# Patient Record
Sex: FEMALE | Race: WHITE | NOT HISPANIC OR LATINO | URBAN - METROPOLITAN AREA
[De-identification: names, ages, dates, MRNs, and addresses within clinical notes are randomized per-mention and may not be internally consistent; named-entity substitution may affect disease eponyms.]

---

## 2020-01-15 ENCOUNTER — OFFICE (OUTPATIENT)
Dept: URBAN - METROPOLITAN AREA CLINIC 64 | Facility: CLINIC | Age: 65
End: 2020-01-15

## 2020-01-15 VITALS
WEIGHT: 160 LBS | SYSTOLIC BLOOD PRESSURE: 148 MMHG | DIASTOLIC BLOOD PRESSURE: 89 MMHG | HEIGHT: 64 IN | HEART RATE: 88 BPM

## 2020-01-15 DIAGNOSIS — K64.8 OTHER HEMORRHOIDS: ICD-10-CM

## 2020-01-15 DIAGNOSIS — R14.0 ABDOMINAL DISTENSION (GASEOUS): ICD-10-CM

## 2020-01-15 DIAGNOSIS — K59.00 CONSTIPATION, UNSPECIFIED: ICD-10-CM

## 2020-01-15 DIAGNOSIS — Z86.010 PERSONAL HISTORY OF COLONIC POLYPS: ICD-10-CM

## 2020-01-15 DIAGNOSIS — K64.4 RESIDUAL HEMORRHOIDAL SKIN TAGS: ICD-10-CM

## 2020-01-15 PROCEDURE — 46600 DIAGNOSTIC ANOSCOPY SPX: CPT | Performed by: NURSE PRACTITIONER

## 2020-01-15 PROCEDURE — 99203 OFFICE O/P NEW LOW 30 MIN: CPT | Performed by: NURSE PRACTITIONER

## 2020-01-22 ENCOUNTER — PREP FOR SURGERY (OUTPATIENT)
Dept: OTHER | Facility: HOSPITAL | Age: 65
End: 2020-01-22

## 2020-01-22 ENCOUNTER — HOSPITAL ENCOUNTER (OUTPATIENT)
Facility: HOSPITAL | Age: 65
Setting detail: HOSPITAL OUTPATIENT SURGERY
End: 2020-01-22
Attending: SURGERY | Admitting: SURGERY

## 2020-01-22 ENCOUNTER — OFFICE VISIT (OUTPATIENT)
Dept: SURGERY | Facility: CLINIC | Age: 65
End: 2020-01-22

## 2020-01-22 VITALS
HEIGHT: 64 IN | WEIGHT: 157 LBS | SYSTOLIC BLOOD PRESSURE: 148 MMHG | DIASTOLIC BLOOD PRESSURE: 90 MMHG | HEART RATE: 78 BPM | BODY MASS INDEX: 26.8 KG/M2 | OXYGEN SATURATION: 98 %

## 2020-01-22 DIAGNOSIS — K64.9 HEMORRHOIDS: Primary | ICD-10-CM

## 2020-01-22 DIAGNOSIS — K64.9 HEMORRHOIDS, UNSPECIFIED HEMORRHOID TYPE: Primary | ICD-10-CM

## 2020-01-22 PROBLEM — Z17.0: Status: ACTIVE | Noted: 2017-10-16

## 2020-01-22 PROBLEM — E78.5 HYPERLIPIDEMIA: Status: ACTIVE | Noted: 2020-01-22

## 2020-01-22 PROBLEM — I10 HYPERTENSION: Status: ACTIVE | Noted: 2020-01-22

## 2020-01-22 PROBLEM — C50.911: Status: ACTIVE | Noted: 2017-10-16

## 2020-01-22 PROBLEM — E11.9 DM TYPE 2 (DIABETES MELLITUS, TYPE 2): Status: ACTIVE | Noted: 2020-01-22

## 2020-01-22 PROCEDURE — 99203 OFFICE O/P NEW LOW 30 MIN: CPT | Performed by: SURGERY

## 2020-01-22 RX ORDER — CETIRIZINE HYDROCHLORIDE 10 MG/1
10 TABLET ORAL DAILY
COMMUNITY

## 2020-01-22 RX ORDER — CLONIDINE HYDROCHLORIDE 0.1 MG/1
TABLET ORAL
COMMUNITY
Start: 2020-01-18 | End: 2022-04-04

## 2020-01-22 RX ORDER — LEVOTHYROXINE SODIUM 0.12 MG/1
125 TABLET ORAL DAILY
COMMUNITY
Start: 2014-05-09 | End: 2022-04-04 | Stop reason: SDUPTHER

## 2020-01-22 RX ORDER — LOSARTAN POTASSIUM AND HYDROCHLOROTHIAZIDE 25; 100 MG/1; MG/1
TABLET ORAL
COMMUNITY
Start: 2020-01-18 | End: 2022-06-09 | Stop reason: SDUPTHER

## 2020-01-22 RX ORDER — CHLORAL HYDRATE 500 MG
1 CAPSULE ORAL
COMMUNITY

## 2020-01-22 RX ORDER — ATORVASTATIN CALCIUM 10 MG/1
TABLET, FILM COATED ORAL
COMMUNITY
Start: 2020-01-18 | End: 2022-04-04

## 2020-01-22 RX ORDER — GUAIFENESIN 600 MG/1
1200 TABLET, EXTENDED RELEASE ORAL
COMMUNITY

## 2020-01-22 RX ORDER — VENLAFAXINE HYDROCHLORIDE 75 MG/1
CAPSULE, EXTENDED RELEASE ORAL
COMMUNITY
Start: 2020-01-18 | End: 2022-04-04 | Stop reason: SDUPTHER

## 2020-01-22 RX ORDER — LORATADINE 10 MG/1
10 TABLET ORAL DAILY
COMMUNITY

## 2020-01-22 RX ORDER — DOCUSATE CALCIUM 240 MG
240 CAPSULE ORAL
COMMUNITY

## 2020-01-22 NOTE — H&P
Subjective   Day Goldstein is a 64 y.o. female.   No chief complaint on file.    There were no vitals taken for this visit.    HISTORY OF PRESENT ILLNESS:  Patient is a pleasant 64-year-old female presenting for evaluation of hemorrhoids.  Patient complains of associated pain and intermittent bleeding.  She has had these symptoms for at least several months.  She also complains of associated hygiene trouble.  Her symptoms have been present for at least several months.  She has been evaluated previously at her gastroenterologist office.  They recommended banding the internal hemorrhoids, but the patient would like external hemorrhoids addressed as well.  Patient reports previous history of chronic constipation which is now controlled using MiraLAX and Metamucil.  She states her hemorrhoids intermittently enlarge and shrink.  She would like to have procedure performed after April due to travel plans.    The following portions of the patient's history were reviewed and updated as appropriate: allergies, current medications, past family history, past medical history, past social history, past surgical history and problem list.    Review of Systems   Constitutional: Negative for fever.   HENT: Positive for sinus pressure.         Dry mouth   Eyes: Negative for blurred vision.   Respiratory: Negative for shortness of breath.    Cardiovascular: Negative for chest pain.   Gastrointestinal: Positive for anal bleeding and rectal pain.   Endocrine: Positive for heat intolerance.   Musculoskeletal: Positive for back pain.   Neurological: Negative for dizziness.   Psychiatric/Behavioral: Negative for depressed mood.     I have reviewed the above ROS and updated as appropriate.    Objective   Physical Exam   Constitutional: She appears well-developed and well-nourished. No distress.   HENT:   Head: Normocephalic and atraumatic.   Within normal limits   Eyes: EOM are normal.   Neck: Normal range of motion.   Cardiovascular:  Normal rate.   Good color; no pallor or cyanosis   Pulmonary/Chest: Effort normal. No respiratory distress.   Genitourinary:   Genitourinary Comments: At least one external hemorrhoid visualized with leafy tissue surrounding anal opening.   Musculoskeletal: Normal range of motion.   Neurological: She is alert. No cranial nerve deficit.   Skin: Skin is dry.   Psychiatric: She has a normal mood and affect. Her behavior is normal.   Vitals reviewed.    Assessment/Plan   Diagnoses and all orders for this visit:    Hemorrhoids  -     Case Request; Standing  -     Case Request    Other orders  -     Follow Anesthesia Guidelines / Standing Orders; Future  -     Provide NPO Instructions to Patient; Future    Discussed with patient that hemorrhoidectomy is extremely painful, explained painful recovery.  Explained need for sitz bath twice daily for 4 to 6 weeks postop.  Patient understands and agrees to plan and would like to proceed with surgery.  We will schedule hemorrhoidectomy after April.    Sheila Tabor MD, 1/22/2020 2:26 PM, acting as scribe for Dr. Tabor.    I, Sheila Tabor MD, personally performed the services described in this documentation as scribed by the above named individual in my presence, and it is both accurate and complete.  1/22/2020  2:43 PM

## 2020-01-22 NOTE — PROGRESS NOTES
"Subjective   Day Goldstein is a 64 y.o. female.   Chief Complaint   Patient presents with   • Hemorrhoids     /90 (BP Location: Left arm, Patient Position: Sitting, Cuff Size: Adult)   Pulse 78   Ht 162.6 cm (64\")   Wt 71.2 kg (157 lb)   SpO2 98%   BMI 26.95 kg/m²     HISTORY OF PRESENT ILLNESS:  Patient is a pleasant 64-year-old female presenting for evaluation of hemorrhoids.  Patient complains of associated pain and intermittent bleeding.  She has had these symptoms for at least several months.  She also complains of associated hygiene trouble.  Her symptoms have been present for at least several months.  She has been evaluated previously at her gastroenterologist office.  They recommended banding the internal hemorrhoids, but the patient would like external hemorrhoids addressed as well.  Patient reports previous history of chronic constipation which is now controlled using MiraLAX and Metamucil.  She states her hemorrhoids intermittently enlarge and shrink.  She would like to have procedure performed after April due to travel plans.    The following portions of the patient's history were reviewed and updated as appropriate: allergies, current medications, past family history, past medical history, past social history, past surgical history and problem list.    Review of Systems   Constitutional: Negative for fever.   HENT: Positive for sinus pressure.         Dry mouth   Eyes: Negative for blurred vision.   Respiratory: Negative for shortness of breath.    Cardiovascular: Negative for chest pain.   Gastrointestinal: Positive for anal bleeding and rectal pain.   Endocrine: Positive for heat intolerance.   Musculoskeletal: Positive for back pain.   Neurological: Negative for dizziness.   Psychiatric/Behavioral: Negative for depressed mood.     I have reviewed the above ROS and updated as appropriate.    Objective   Physical Exam   Constitutional: She appears well-developed and well-nourished. No " distress.   HENT:   Head: Normocephalic and atraumatic.   Within normal limits   Eyes: EOM are normal.   Neck: Normal range of motion.   Cardiovascular: Normal rate.   Good color; no pallor or cyanosis   Pulmonary/Chest: Effort normal. No respiratory distress.   Genitourinary:   Genitourinary Comments: At least one external hemorrhoid visualized with leafy tissue surrounding anal opening.   Musculoskeletal: Normal range of motion.   Neurological: She is alert. No cranial nerve deficit.   Skin: Skin is dry.   Psychiatric: She has a normal mood and affect. Her behavior is normal.   Vitals reviewed.    Assessment/Plan   Day was seen today for hemorrhoids.    Diagnoses and all orders for this visit:    Hemorrhoids, unspecified hemorrhoid type    Discussed with patient that hemorrhoidectomy is extremely painful, explained painful recovery.  Explained need for sitz bath twice daily for 4 to 6 weeks postop.  Patient understands and agrees to plan and would like to proceed with surgery.  We will schedule hemorrhoidectomy after April.    Sandrine Watt PA-C, 1/22/2020 2:26 PM, acting as scribe for Dr. Tabor.    I, Sheila Tabor MD, personally performed the services described in this documentation as scribed by the above named individual in my presence, and it is both accurate and complete.  1/22/2020  2:42 PM

## 2020-05-22 ENCOUNTER — PREP FOR SURGERY (OUTPATIENT)
Dept: OTHER | Facility: HOSPITAL | Age: 65
End: 2020-05-22

## 2020-05-22 DIAGNOSIS — K64.9 HEMORRHOIDS, UNSPECIFIED HEMORRHOID TYPE: Primary | ICD-10-CM

## 2020-05-22 RX ORDER — CLINDAMYCIN PHOSPHATE 900 MG/50ML
900 INJECTION, SOLUTION INTRAVENOUS ONCE
Status: CANCELLED | OUTPATIENT
Start: 2020-05-22 | End: 2020-05-22

## 2020-08-26 ENCOUNTER — TELEPHONE (OUTPATIENT)
Dept: SURGERY | Facility: CLINIC | Age: 65
End: 2020-08-26

## 2020-09-18 ENCOUNTER — PREP FOR SURGERY (OUTPATIENT)
Dept: OTHER | Facility: HOSPITAL | Age: 65
End: 2020-09-18

## 2020-09-18 ENCOUNTER — OFFICE VISIT (OUTPATIENT)
Dept: SURGERY | Facility: CLINIC | Age: 65
End: 2020-09-18

## 2020-09-18 VITALS — TEMPERATURE: 96.8 F

## 2020-09-18 DIAGNOSIS — K64.9 HEMORRHOIDS, UNSPECIFIED HEMORRHOID TYPE: Primary | ICD-10-CM

## 2020-09-18 PROCEDURE — 99213 OFFICE O/P EST LOW 20 MIN: CPT | Performed by: SURGERY

## 2020-09-18 RX ORDER — CLINDAMYCIN PHOSPHATE 900 MG/50ML
900 INJECTION, SOLUTION INTRAVENOUS ONCE
Status: CANCELLED | OUTPATIENT
Start: 2020-09-18 | End: 2020-09-18

## 2020-09-18 NOTE — H&P
Subjective   Day Goldstein is a 65 y.o. female.   No chief complaint on file.    There were no vitals taken for this visit.    HISTORY OF PRESENT ILLNESS:  She was last seen by me in January with a symptomatic internal and external hemorrhoid.  She was scheduled for surgery which had to be canceled due to the coronavirus pandemic.  She is remained persistently symptomatic and now desires hemorrhoidectomy.      The following portions of the patient's history were reviewed and updated as appropriate: allergies, current medications, past family history, past medical history, past social history, past surgical history and problem list.    Review of Systems   Constitutional: Negative for activity change and diaphoresis.   HENT: Negative.  Negative for sore throat and voice change.    Eyes: Negative for blurred vision.   Respiratory: Negative for wheezing.    Cardiovascular: Negative for chest pain.   Gastrointestinal: Positive for anal bleeding and rectal pain.   Endocrine: Negative.  Negative for polyuria.   Genitourinary: Negative for urinary incontinence.   Musculoskeletal: Negative for arthralgias.   Skin: Negative for color change.   Neurological: Negative for dizziness, speech difficulty and confusion.   Hematological: Does not bruise/bleed easily.   Psychiatric/Behavioral: Negative for agitation.       Objective   Physical Exam  Constitutional:       Appearance: She is well-developed.   HENT:      Head: Normocephalic and atraumatic.   Neck:      Musculoskeletal: Normal range of motion.   Cardiovascular:      Rate and Rhythm: Normal rate.   Pulmonary:      Effort: Pulmonary effort is normal.   Abdominal:      Palpations: Abdomen is soft.   Genitourinary:     Comments: Single internal/external hemorrhoid  Musculoskeletal: Normal range of motion.   Skin:     General: Skin is warm and dry.   Neurological:      Mental Status: She is alert and oriented to person, place, and time.           Assessment/Plan   There  are no diagnoses linked to this encounter.  Patient would benefit from hemorrhoidectomy.  She is failed medical management.  I have discussed the risk of general anesthesia bleeding infection as well as postop pain.  She does understand and agree with plan as outlined.  Sheila Tabor MD  9/18/2020  12:06 PM EDT

## 2020-09-18 NOTE — PROGRESS NOTES
Subjective   Day Goldstein is a 65 y.o. female.   Chief Complaint   Patient presents with   • Hemorrhoids     f/u     Temp 96.8 °F (36 °C) (Temporal)     HISTORY OF PRESENT ILLNESS:  She was last seen by me in January with a symptomatic internal and external hemorrhoid.  She was scheduled for surgery which had to be canceled due to the coronavirus pandemic.  She is remained persistently symptomatic and now desires hemorrhoidectomy.      The following portions of the patient's history were reviewed and updated as appropriate: allergies, current medications, past family history, past medical history, past social history, past surgical history and problem list.    Review of Systems   Constitutional: Negative for activity change and diaphoresis.   HENT: Negative.  Negative for sore throat and voice change.    Eyes: Negative for blurred vision.   Respiratory: Negative for wheezing.    Cardiovascular: Negative for chest pain.   Gastrointestinal: Positive for anal bleeding and rectal pain.   Endocrine: Negative.  Negative for polyuria.   Genitourinary: Negative for urinary incontinence.   Musculoskeletal: Negative for arthralgias.   Skin: Negative for color change.   Neurological: Negative for dizziness, speech difficulty and confusion.   Hematological: Does not bruise/bleed easily.   Psychiatric/Behavioral: Negative for agitation.       Objective   Physical Exam  Constitutional:       Appearance: She is well-developed.   HENT:      Head: Normocephalic and atraumatic.   Neck:      Musculoskeletal: Normal range of motion.   Cardiovascular:      Rate and Rhythm: Normal rate.   Pulmonary:      Effort: Pulmonary effort is normal.   Abdominal:      Palpations: Abdomen is soft.   Genitourinary:     Comments: Single internal/external hemorrhoid  Musculoskeletal: Normal range of motion.   Skin:     General: Skin is warm and dry.   Neurological:      Mental Status: She is alert and oriented to person, place, and time.            Assessment/Plan   Day was seen today for hemorrhoids.    Diagnoses and all orders for this visit:    Hemorrhoids, unspecified hemorrhoid type      Patient would benefit from hemorrhoidectomy.  She is failed medical management.  I have discussed the risk of general anesthesia bleeding infection as well as postop pain.  She does understand and agree with plan as outlined.  Sheila Tabor MD  9/18/2020  12:06 PM EDT

## 2022-04-01 PROBLEM — E55.9 VITAMIN D DEFICIENCY: Status: ACTIVE | Noted: 2022-04-01

## 2022-04-01 PROBLEM — Z78.0 POSTMENOPAUSAL STATE: Status: ACTIVE | Noted: 2022-04-01

## 2022-04-01 PROBLEM — E11.9 TYPE 2 DIABETES MELLITUS WITHOUT COMPLICATION, WITHOUT LONG-TERM CURRENT USE OF INSULIN (HCC): Chronic | Status: ACTIVE | Noted: 2020-01-22

## 2022-04-01 PROBLEM — E55.9 VITAMIN D DEFICIENCY: Chronic | Status: ACTIVE | Noted: 2022-04-01

## 2022-04-01 PROBLEM — Z51.81 THERAPEUTIC DRUG MONITORING: Status: ACTIVE | Noted: 2022-04-01

## 2022-04-01 PROBLEM — Z85.3 HISTORY OF RIGHT BREAST CANCER: Status: ACTIVE | Noted: 2017-10-16

## 2022-04-01 PROBLEM — E78.2 MIXED HYPERLIPIDEMIA: Status: ACTIVE | Noted: 2020-01-22

## 2022-04-01 PROBLEM — E78.2 MIXED HYPERLIPIDEMIA: Chronic | Status: ACTIVE | Noted: 2020-01-22

## 2022-04-01 PROBLEM — Z98.890 HISTORY OF HEMORRHOIDECTOMY: Status: ACTIVE | Noted: 2020-01-22

## 2022-04-01 PROBLEM — Z85.3 HISTORY OF RIGHT BREAST CANCER: Chronic | Status: ACTIVE | Noted: 2017-10-16

## 2022-04-01 PROBLEM — Z90.11 S/P RIGHT MASTECTOMY: Status: ACTIVE | Noted: 2020-08-17

## 2022-04-01 PROBLEM — Z78.0 POSTMENOPAUSAL STATE: Chronic | Status: ACTIVE | Noted: 2022-04-01

## 2022-04-01 PROBLEM — F41.8 DEPRESSION WITH ANXIETY: Status: ACTIVE | Noted: 2022-04-01

## 2022-04-01 PROBLEM — I10 BENIGN ESSENTIAL HYPERTENSION: Chronic | Status: ACTIVE | Noted: 2020-01-22

## 2022-04-04 ENCOUNTER — OFFICE VISIT (OUTPATIENT)
Dept: INTERNAL MEDICINE | Facility: CLINIC | Age: 67
End: 2022-04-04

## 2022-04-04 VITALS
HEIGHT: 64 IN | RESPIRATION RATE: 16 BRPM | DIASTOLIC BLOOD PRESSURE: 71 MMHG | SYSTOLIC BLOOD PRESSURE: 150 MMHG | BODY MASS INDEX: 28 KG/M2 | WEIGHT: 164 LBS | HEART RATE: 84 BPM | OXYGEN SATURATION: 99 %

## 2022-04-04 DIAGNOSIS — I10 BENIGN ESSENTIAL HYPERTENSION: Chronic | ICD-10-CM

## 2022-04-04 DIAGNOSIS — M54.40 CHRONIC MIDLINE LOW BACK PAIN WITH SCIATICA, SCIATICA LATERALITY UNSPECIFIED: Primary | Chronic | ICD-10-CM

## 2022-04-04 DIAGNOSIS — G89.29 CHRONIC MIDLINE LOW BACK PAIN WITH SCIATICA, SCIATICA LATERALITY UNSPECIFIED: Primary | Chronic | ICD-10-CM

## 2022-04-04 DIAGNOSIS — Z90.11 S/P RIGHT MASTECTOMY: Chronic | ICD-10-CM

## 2022-04-04 DIAGNOSIS — E66.3 OVERWEIGHT: ICD-10-CM

## 2022-04-04 DIAGNOSIS — Z78.9 STATIN INTOLERANCE: Chronic | ICD-10-CM

## 2022-04-04 DIAGNOSIS — E55.9 VITAMIN D DEFICIENCY: Chronic | ICD-10-CM

## 2022-04-04 DIAGNOSIS — G89.29 CHRONIC LEFT-SIDED LOW BACK PAIN WITH LEFT-SIDED SCIATICA: ICD-10-CM

## 2022-04-04 DIAGNOSIS — Z91.09 MULTIPLE ENVIRONMENTAL ALLERGIES: ICD-10-CM

## 2022-04-04 DIAGNOSIS — Z78.0 POSTMENOPAUSAL STATE: Chronic | ICD-10-CM

## 2022-04-04 DIAGNOSIS — Z11.59 NEED FOR HEPATITIS C SCREENING TEST: ICD-10-CM

## 2022-04-04 DIAGNOSIS — K59.09 CHRONIC CONSTIPATION: ICD-10-CM

## 2022-04-04 DIAGNOSIS — M54.42 CHRONIC LEFT-SIDED LOW BACK PAIN WITH LEFT-SIDED SCIATICA: ICD-10-CM

## 2022-04-04 DIAGNOSIS — E11.9 TYPE 2 DIABETES MELLITUS WITHOUT COMPLICATION, WITHOUT LONG-TERM CURRENT USE OF INSULIN: Chronic | ICD-10-CM

## 2022-04-04 DIAGNOSIS — M48.061 SPINAL STENOSIS OF LUMBAR REGION WITHOUT NEUROGENIC CLAUDICATION: Chronic | ICD-10-CM

## 2022-04-04 DIAGNOSIS — Z51.81 THERAPEUTIC DRUG MONITORING: ICD-10-CM

## 2022-04-04 DIAGNOSIS — Z85.3 HISTORY OF RIGHT BREAST CANCER: Chronic | ICD-10-CM

## 2022-04-04 DIAGNOSIS — G47.33 OBSTRUCTIVE SLEEP APNEA: Chronic | ICD-10-CM

## 2022-04-04 DIAGNOSIS — E78.2 MIXED HYPERLIPIDEMIA: Chronic | ICD-10-CM

## 2022-04-04 DIAGNOSIS — F41.8 DEPRESSION WITH ANXIETY: Chronic | ICD-10-CM

## 2022-04-04 PROBLEM — L71.9 ROSACEA: Status: ACTIVE | Noted: 2022-04-04

## 2022-04-04 PROBLEM — Z86.010 HISTORY OF COLON POLYPS: Chronic | Status: ACTIVE | Noted: 2022-04-04

## 2022-04-04 PROBLEM — Z86.0100 HISTORY OF COLON POLYPS: Chronic | Status: ACTIVE | Noted: 2022-04-04

## 2022-04-04 PROBLEM — M54.50 CHRONIC LOWER BACK PAIN: Chronic | Status: ACTIVE | Noted: 2022-04-04

## 2022-04-04 PROBLEM — Z86.0100 HISTORY OF COLON POLYPS: Status: ACTIVE | Noted: 2022-04-04

## 2022-04-04 PROBLEM — M54.50 CHRONIC LOWER BACK PAIN: Status: ACTIVE | Noted: 2022-04-04

## 2022-04-04 PROBLEM — L71.9 ROSACEA: Chronic | Status: ACTIVE | Noted: 2022-04-04

## 2022-04-04 PROBLEM — Z86.010 HISTORY OF COLON POLYPS: Status: ACTIVE | Noted: 2022-04-04

## 2022-04-04 PROBLEM — Z98.890 HISTORY OF HEMORRHOIDECTOMY: Status: RESOLVED | Noted: 2020-01-22 | Resolved: 2022-04-04

## 2022-04-04 PROCEDURE — 99204 OFFICE O/P NEW MOD 45 MIN: CPT | Performed by: INTERNAL MEDICINE

## 2022-04-04 RX ORDER — GLIPIZIDE 5 MG/1
5 TABLET, FILM COATED, EXTENDED RELEASE ORAL DAILY
COMMUNITY
End: 2022-04-04

## 2022-04-04 RX ORDER — VENLAFAXINE HYDROCHLORIDE 75 MG/1
CAPSULE, EXTENDED RELEASE ORAL
Qty: 90 CAPSULE | Refills: 3
Start: 2022-04-04 | End: 2022-06-09 | Stop reason: SDUPTHER

## 2022-04-04 RX ORDER — ATORVASTATIN CALCIUM 10 MG/1
TABLET, FILM COATED ORAL
Qty: 90 TABLET | Refills: 3
Start: 2022-04-04 | End: 2022-06-09 | Stop reason: SDUPTHER

## 2022-04-04 RX ORDER — LEVOTHYROXINE SODIUM 0.12 MG/1
TABLET ORAL
Qty: 90 TABLET | Refills: 3
Start: 2022-04-04 | End: 2022-06-09 | Stop reason: SDUPTHER

## 2022-04-04 RX ORDER — ATORVASTATIN CALCIUM 10 MG/1
TABLET, FILM COATED ORAL EVERY 24 HOURS
COMMUNITY
End: 2022-04-04 | Stop reason: SDUPTHER

## 2022-04-04 NOTE — PROGRESS NOTES
04/04/2022    Patient Information  Day Goldstein                                                                                          [address] [place] [address]      1955  [unfilled]  There is no work phone number on file.    Chief Complaint:     New patient to get established.  Complaining of chronic lower back pain.    History of Present Illness:    Patient with multiple medical problems including type 2 diabetes, hyperlipidemia, statin intolerance except for Lipitor, lumbar spinal stenosis and chronic lower back pain, hypertension, remote history of right breast cancer status post mastectomy, sleep apnea, overweight, depression with anxiety in remission with Effexor, chronic constipation, vitamin D deficiency, postmenopausal state, environmental allergies.  She presents today to get established and also wants to discuss her chronic lower back pain.  Her past medical history reviewed and updated were necessary including health maintenance parameters.  This reveals she needs Medicare wellness visit.  Patient also had a colonoscopy but I do not have that documentation.  She also needs a DEXA scan.    Review of Systems   Constitutional: Negative.   HENT: Negative.    Eyes: Negative.    Cardiovascular: Negative.    Respiratory: Negative.    Endocrine: Negative.    Hematologic/Lymphatic: Negative.    Skin: Negative.    Musculoskeletal: Positive for arthritis and back pain.   Gastrointestinal: Negative.    Genitourinary: Negative.    Neurological: Negative.    Psychiatric/Behavioral: Negative.    Allergic/Immunologic: Negative.        Active Problems:    Patient Active Problem List   Diagnosis   • Type 2 diabetes mellitus without complication, without long-term current use of insulin (HCC)   • Hyperlipidemia   • Benign essential hypertension   • History of right breast cancer, 2004--status post mastectomy, chemotherapy, Adriamycin and Cytoxan.  Arimidex.   • S/P right mastectomy   •  Therapeutic drug monitoring   • Vitamin D deficiency   • Postmenopausal state   • Depression with anxiety   • Chronic left-sided low back pain with left-sided sciatica   • Obstructive sleep apnea   • Spinal stenosis of lumbar region without neurogenic claudication   • Chronic constipation   • Overweight   • Rosacea   • Statin intolerance, except for Lipitor.  Myalgias   • Multiple environmental allergies   • History of colon polyps         Past Medical History:   Diagnosis Date   • Benign essential hypertension 1/22/2020   • Chronic constipation 4/4/2022   • Chronic left-sided low back pain with left-sided sciatica 4/4/2022 October 30, 2020--MRI of the lumbar spine reveals no evidence of osseous metastatic disease.  Facet osteophytes and disc bulges resulting in moderate to severe neural foraminal stenosis at L3-L4, L4-L5, and L5-S1.  There is moderate to severe spinal stenosis at L3-L4 and L4-L5.   • Chronic lower back pain 4/4/2022 October 30, 2020--MRI of the lumbar spine reveals no evidence of osseous metastatic disease.  Facet osteophytes and disc bulges resulting in moderate to severe neural foraminal stenosis at L3-L4, L4-L5, and L5-S1.  There is moderate to severe spinal stenosis at L3-L4 and L4-L5.   • Depression with anxiety 4/1/2022   • History of colon polyps 4/4/2022 August 22, 2018--colonoscopy was normal except for redundant colon.  This was followed up with an air-contrast barium enema which revealed a very long and tortuous colon.  No mass or polyp was noted.   • History of right breast cancer 10/16/2017    3/23/2004: Right mastectomy/axillary node dissection.   • Hyperlipidemia 1/22/2020   • Multiple environmental allergies 4/4/2022   • Obstructive sleep apnea 4/4/2022   • Overweight 4/4/2022   • Postmenopausal state 4/1/2022   • S/P right mastectomy 8/17/2020    3/23/2004: Right mastectomy/axillary node dissection.   • Spinal stenosis of lumbar region without neurogenic claudication  4/4/2022   • Statin intolerance, except for Lipitor.  Myalgias 4/4/2022   • Type 2 diabetes mellitus without complication, without long-term current use of insulin (HCC) 1/22/2020   • Vitamin D deficiency 4/1/2022         Past Surgical History:   Procedure Laterality Date   • COLONOSCOPY  08/22/2018 August 22, 2018--colonoscopy was normal except for redundant colon.  This was followed up with an air-contrast barium enema which revealed a very long and tortuous colon.  No mass or polyp was noted.   • HEMORRHOIDECTOMY  01/26/2022 January 26, 2022--excision of internal and external grade 3 hemorrhoids.   • MASTECTOMY MODIFIED RADICAL / SIMPLE / COMPLETE Right 03/23/2004    3/23/2004: Right mastectomy/axillary node dissection.   • TONSILLECTOMY           Allergies   Allergen Reactions   • Hydromorphone Hcl Itching   • Statins Myalgia   • Thimerosal Unknown - High Severity   • Penicillins Itching and Rash           Current Outpatient Medications:   •  atorvastatin (LIPITOR) 10 MG tablet, Take 1 p.o. daily for high cholesterol, Disp: 90 tablet, Rfl: 3  •  CALCIUM & MAGNESIUM CARBONATES PO, Take 1 tablet by mouth Daily., Disp: , Rfl:   •  cetirizine (zyrTEC) 10 MG tablet, Take 10 mg by mouth Daily., Disp: , Rfl:   •  Cholecalciferol (vitamin D3) 125 MCG (5000 UT) capsule capsule, Take 5,000 Units by mouth Daily., Disp: , Rfl:   •  docusate calcium (SURFAK) 240 MG capsule, Take 240 mg by mouth., Disp: , Rfl:   •  guaiFENesin (MUCINEX) 600 MG 12 hr tablet, Take 1,200 mg by mouth., Disp: , Rfl:   •  levothyroxine (SYNTHROID, LEVOTHROID) 125 MCG tablet, Take 1 p.o. daily for low thyroid, Disp: 90 tablet, Rfl: 3  •  loratadine (CLARITIN) 10 MG tablet, Take 10 mg by mouth Daily., Disp: , Rfl:   •  losartan-hydrochlorothiazide (HYZAAR) 100-25 MG per tablet, , Disp: , Rfl:   •  metFORMIN (GLUCOPHAGE) 1000 MG tablet, Take 1 p.o. twice daily for diabetes, Disp: 180 tablet, Rfl: 3  •  Omega-3 Fatty Acids (FISH OIL) 1000 MG  "capsule capsule, Take 1 capsule by mouth., Disp: , Rfl:   •  Polyethylene Glycol 3350 (MIRALAX PO), Take  by mouth., Disp: , Rfl:   •  venlafaxine XR (EFFEXOR-XR) 75 MG 24 hr capsule, Take 1 p.o. daily for depression, Disp: 90 capsule, Rfl: 3  •  Coenzyme Q10 (CoQ10 Maximum Strength) 400 MG capsule, Take 1 p.o. daily with food as directed, Disp: , Rfl: 0      Family History   Problem Relation Age of Onset   • Emphysema Mother    • Alcohol abuse Father    • Hyperlipidemia Sister    • Breast cancer Sister    • Prostate cancer Brother    • Hypothyroidism Brother          Social History     Socioeconomic History   • Marital status:    Tobacco Use   • Smoking status: Never Smoker   • Smokeless tobacco: Never Used   Vaping Use   • Vaping Use: Never used   Substance and Sexual Activity   • Alcohol use: Not Currently     Comment: Social alcohol consumption,, maybe 2 drinks per month.   • Drug use: Never   • Sexual activity: Yes     Partners: Male         Vitals:    04/04/22 1406   BP: 150/71   BP Location: Right arm   Patient Position: Sitting   Cuff Size: Adult   Pulse: 84   Resp: 16   SpO2: 99%   Weight: 74.4 kg (164 lb)   Height: 162.6 cm (64\")        Body mass index is 28.15 kg/m².      Physical Exam:    General: Alert and oriented x 3.  No acute distress.  Overweight.  Normal affect.  HEENT: Pupils equal, round, reactive to light; extraocular movements intact; sclerae nonicteric; pharynx, ear canals and TMs normal.  Neck: Without JVD, thyromegaly, bruit, or adenopathy.  Lungs: Clear to auscultation in all fields.  Heart: Regular rate and rhythm without murmur, rub, gallop, or click.  Abdomen: Soft, nontender, without hepatosplenomegaly or hernia.  Bowel sounds normal.  : Deferred.  Rectal: Deferred.  Extremities: Without clubbing, cyanosis, edema, or pulse deficit.  Neurologic: Intact without focal deficit.  Normal station and gait observed during ingress and egress from the examination room.  Skin: Without " significant lesion.  Musculoskeletal: Unremarkable.    Lab/other results:    I reviewed the results of the MRI of the lumbar spine which was obtained October 30, 2020.    Assessment/Plan:     Diagnosis Plan   1. Chronic midline low back pain with sciatica, sciatica laterality unspecified  Ambulatory Referral to Orthopedic Surgery   2. Spinal stenosis of lumbar region without neurogenic claudication  Ambulatory Referral to Orthopedic Surgery   3. Hyperlipidemia  CK    Comprehensive Metabolic Panel    NMR LipoProfile    TSH    T4, Free    T3, Free    atorvastatin (LIPITOR) 10 MG tablet    levothyroxine (SYNTHROID, LEVOTHROID) 125 MCG tablet   4. Statin intolerance, except for Lipitor.  Myalgias  Coenzyme Q10 (CoQ10 Maximum Strength) 400 MG capsule   5. Type 2 diabetes mellitus without complication, without long-term current use of insulin (HCC)  Hemoglobin A1c    Microalbumin / Creatinine Urine Ratio - Urine, Clean Catch    Urinalysis With Microscopic If Indicated (No Culture) - Urine, Clean Catch    metFORMIN (GLUCOPHAGE) 1000 MG tablet   6. Benign essential hypertension     7. History of right breast cancer     8. S/P right mastectomy     9. Obstructive sleep apnea     10. Overweight     11. Depression with anxiety  venlafaxine XR (EFFEXOR-XR) 75 MG 24 hr capsule   12. Chronic constipation     13. Vitamin D deficiency  Vitamin D 25 Hydroxy   14. Postmenopausal state  DEXA Bone Density Axial   15. Multiple environmental allergies     16. Need for hepatitis C screening test  Hepatitis C Antibody   17. Therapeutic drug monitoring  CBC (No Diff)   18. Chronic left-sided low back pain with left-sided sciatica       Patient with chronic lower back pain and lumbar spinal stenosis and I think she would benefit from orthopedic spine specialist referral.  Patient has hyperlipidemia but is not tolerating Lipitor on a daily basis.  She is taking coenzyme Q 10 but only taking 100 mg/day.  See below.  She will be possibly  entering a study at [facility] in the near future.  She has type 2 diabetes and reports her blood sugars have been running in the 200s at times.  See below for medication adjustment.  Her blood pressure little high today but I hate to make a change based on one reading other than I think she can do without the Catapres which was prescribed primarily for menopausal symptoms such as hot flashes.  She has a history of right breast cancer treated with chemo and mastectomy followed up with hormonal therapy without any evidence of recurrence.  She has sleep apnea and tolerates CPAP well.  She needs vitamin D assessed because of her postmenopausal status and I think it is due for a DEXA scan.  She has environmental allergies and takes Claritin and Zyrtec over-the-counter.    Plan is as follows: Discontinue glipizide and start Rybelsus 3 mg/day for the first month.  After that she should increase it to 2 pills or 6 mg/day, samples given.  Stay on Metformin 1 g p.o. twice daily.  Stay on the levothyroxine 125 mcg/day.  Discontinue clonidine.  I am going to keep her on the losartan HCT and reassess at the next visit.  Orthopedic spine referral given.  She is up-to-date on her colonoscopy.  Try to take the a total of a statin every day but start co-Q10 400 mg/day with food.  I will have her obtain fasting lab work in about 4 to 5 weeks and then follow-up 1 week later which will also be Medicare wellness visit.      Procedures

## 2022-04-14 ENCOUNTER — PATIENT ROUNDING (BHMG ONLY) (OUTPATIENT)
Dept: INTERNAL MEDICINE | Facility: CLINIC | Age: 67
End: 2022-04-14

## 2022-04-14 NOTE — PROGRESS NOTES
April 14, 2022    Hello, may I speak with Day Goldstein?    My name is cayla    I am  with [facility]  [address]  [facility] [address] [phone].    Before we get started may I verify your date of birth? 1955    I am calling to officially welcome you to our practice and ask about your recent visit. Is this a good time to talk? yes  Tell me about your visit with us. What things went well?  yes     We're always looking for ways to make our patients' experiences even better. Do you have recommendations on ways we may improve?  no    Overall were you satisfied with your first visit to our practice? yes     I appreciate you taking the time to speak with me today. Is there anything else I can do for you? no    Thank you, and have a great day.

## 2022-05-02 ENCOUNTER — TELEPHONE (OUTPATIENT)
Dept: INTERNAL MEDICINE | Facility: CLINIC | Age: 67
End: 2022-05-02

## 2022-05-02 NOTE — TELEPHONE ENCOUNTER
Caller: Day Goldstein    Relationship: Self    Best call back number: [phone]    Equipment requested: CPAP MACHINE AND SUPPLIES    Reason for the request: PATIENT JUST FLEW BACK FROM [place] AND LEFT IT ON THE PLANE.      Prescribing Provider: DR. SERENA SINGH    Additional information or concerns:   PATIENT DOESN'T HAVE A PREFERENCE ON WHERE IT COMES FROM, SHE JUST NEEDS IT ASAP.    PLEASE ALSO CHECK ON APPOINTMENT AT THE [facility] [place].

## 2022-05-05 ENCOUNTER — HOSPITAL ENCOUNTER (OUTPATIENT)
Dept: BONE DENSITY | Facility: HOSPITAL | Age: 67
Discharge: HOME | End: 2022-05-05
Admitting: INTERNAL MEDICINE

## 2022-05-05 DIAGNOSIS — M48.061 SPINAL STENOSIS OF LUMBAR REGION WITHOUT NEUROGENIC CLAUDICATION: Primary | Chronic | ICD-10-CM

## 2022-05-05 PROCEDURE — 77080 DXA BONE DENSITY AXIAL: CPT

## 2022-05-05 NOTE — TELEPHONE ENCOUNTER
Hand written order for replacement CPAP machine tubing and supplies.  Patient can come pick it up.  I placed a referral for the [facility] regarding her back pain.  Someone should be contacting her regarding this.

## 2022-05-05 NOTE — TELEPHONE ENCOUNTER
Will you write this out for pt with supplies and I can call and have her come . Also, she wants to go to [facility], the referral needs to be changed to reflect that info.

## 2022-06-04 ENCOUNTER — LAB (OUTPATIENT)
Dept: LAB | Facility: HOSPITAL | Age: 67
End: 2022-06-04

## 2022-06-04 DIAGNOSIS — Z51.81 THERAPEUTIC DRUG MONITORING: ICD-10-CM

## 2022-06-04 DIAGNOSIS — E78.2 MIXED HYPERLIPIDEMIA: Chronic | ICD-10-CM

## 2022-06-04 DIAGNOSIS — Z11.59 NEED FOR HEPATITIS C SCREENING TEST: ICD-10-CM

## 2022-06-04 DIAGNOSIS — E55.9 VITAMIN D DEFICIENCY: Chronic | ICD-10-CM

## 2022-06-04 DIAGNOSIS — E11.9 TYPE 2 DIABETES MELLITUS WITHOUT COMPLICATION, WITHOUT LONG-TERM CURRENT USE OF INSULIN: ICD-10-CM

## 2022-06-04 LAB
25(OH)D3 SERPL-MCNC: 72.2 NG/ML (ref 30–100)
ALBUMIN SERPL-MCNC: 4.6 G/DL (ref 3.5–5.2)
ALBUMIN UR-MCNC: 2.7 MG/DL
ALBUMIN/GLOB SERPL: 1.5 G/DL
ALP SERPL-CCNC: 55 U/L (ref 39–117)
ALT SERPL W P-5'-P-CCNC: 22 U/L (ref 1–33)
ANION GAP SERPL CALCULATED.3IONS-SCNC: 13 MMOL/L (ref 5–15)
AST SERPL-CCNC: 20 U/L (ref 1–32)
BACTERIA UR QL AUTO: NORMAL /HPF
BILIRUB SERPL-MCNC: 0.7 MG/DL (ref 0–1.2)
BILIRUB UR QL STRIP: NEGATIVE
BUN SERPL-MCNC: 13 MG/DL (ref 8–23)
BUN/CREAT SERPL: 19.4 (ref 7–25)
CALCIUM SPEC-SCNC: 9.9 MG/DL (ref 8.6–10.5)
CHLORIDE SERPL-SCNC: 101 MMOL/L (ref 98–107)
CK SERPL-CCNC: 93 U/L (ref 20–180)
CLARITY UR: CLEAR
CO2 SERPL-SCNC: 28 MMOL/L (ref 22–29)
COLOR UR: YELLOW
CREAT SERPL-MCNC: 0.67 MG/DL (ref 0.57–1)
CREAT UR-MCNC: 80.6 MG/DL
DEPRECATED RDW RBC AUTO: 39.8 FL (ref 37–54)
EGFRCR SERPLBLD CKD-EPI 2021: 95.9 ML/MIN/1.73
ERYTHROCYTE [DISTWIDTH] IN BLOOD BY AUTOMATED COUNT: 12.3 % (ref 12.3–15.4)
GLOBULIN UR ELPH-MCNC: 3.1 GM/DL
GLUCOSE SERPL-MCNC: 188 MG/DL (ref 65–99)
GLUCOSE UR STRIP-MCNC: ABNORMAL MG/DL
HCT VFR BLD AUTO: 45.4 % (ref 34–46.6)
HCV AB SER DONR QL: NORMAL
HGB BLD-MCNC: 14.6 G/DL (ref 12–15.9)
HGB UR QL STRIP.AUTO: NEGATIVE
HYALINE CASTS UR QL AUTO: NORMAL /LPF
KETONES UR QL STRIP: NEGATIVE
LEUKOCYTE ESTERASE UR QL STRIP.AUTO: ABNORMAL
MCH RBC QN AUTO: 28.3 PG (ref 26.6–33)
MCHC RBC AUTO-ENTMCNC: 32.2 G/DL (ref 31.5–35.7)
MCV RBC AUTO: 88.2 FL (ref 79–97)
MICROALBUMIN/CREAT UR: 33.5 MG/G
NITRITE UR QL STRIP: NEGATIVE
PH UR STRIP.AUTO: 8.5 [PH] (ref 5–8)
PLATELET # BLD AUTO: 268 10*3/MM3 (ref 140–450)
PMV BLD AUTO: 10 FL (ref 6–12)
POTASSIUM SERPL-SCNC: 4 MMOL/L (ref 3.5–5.2)
PROT SERPL-MCNC: 7.7 G/DL (ref 6–8.5)
PROT UR QL STRIP: ABNORMAL
RBC # BLD AUTO: 5.15 10*6/MM3 (ref 3.77–5.28)
RBC # UR STRIP: NORMAL /HPF
REF LAB TEST METHOD: NORMAL
SODIUM SERPL-SCNC: 142 MMOL/L (ref 136–145)
SP GR UR STRIP: 1.02 (ref 1–1.03)
SQUAMOUS #/AREA URNS HPF: NORMAL /HPF
T3FREE SERPL-MCNC: 2.83 PG/ML (ref 2–4.4)
T4 FREE SERPL-MCNC: 1.26 NG/DL (ref 0.93–1.7)
TSH SERPL DL<=0.05 MIU/L-ACNC: 0.51 UIU/ML (ref 0.27–4.2)
UROBILINOGEN UR QL STRIP: ABNORMAL
WBC # UR STRIP: NORMAL /HPF
WBC NRBC COR # BLD: 6.54 10*3/MM3 (ref 3.4–10.8)

## 2022-06-04 PROCEDURE — 82306 VITAMIN D 25 HYDROXY: CPT

## 2022-06-04 PROCEDURE — 82043 UR ALBUMIN QUANTITATIVE: CPT

## 2022-06-04 PROCEDURE — 36415 COLL VENOUS BLD VENIPUNCTURE: CPT

## 2022-06-04 PROCEDURE — 83704 LIPOPROTEIN BLD QUAN PART: CPT

## 2022-06-04 PROCEDURE — 80061 LIPID PANEL: CPT

## 2022-06-04 PROCEDURE — 84439 ASSAY OF FREE THYROXINE: CPT

## 2022-06-04 PROCEDURE — 86803 HEPATITIS C AB TEST: CPT

## 2022-06-04 PROCEDURE — 84443 ASSAY THYROID STIM HORMONE: CPT

## 2022-06-04 PROCEDURE — 84481 FREE ASSAY (FT-3): CPT

## 2022-06-04 PROCEDURE — 83036 HEMOGLOBIN GLYCOSYLATED A1C: CPT

## 2022-06-04 PROCEDURE — 85027 COMPLETE CBC AUTOMATED: CPT

## 2022-06-04 PROCEDURE — 81001 URINALYSIS AUTO W/SCOPE: CPT

## 2022-06-04 PROCEDURE — 82550 ASSAY OF CK (CPK): CPT

## 2022-06-04 PROCEDURE — 80053 COMPREHEN METABOLIC PANEL: CPT

## 2022-06-04 PROCEDURE — 82570 ASSAY OF URINE CREATININE: CPT

## 2022-06-06 LAB
CHOLEST SERPL-MCNC: 189 MG/DL (ref 100–199)
HBA1C MFR BLD: 8.5 % (ref 3.5–5.6)
HDL SERPL-SCNC: 35.8 UMOL/L
HDLC SERPL-MCNC: 54 MG/DL
LDL SERPL QN: 20.7 NM
LDL SERPL-SCNC: 1240 NMOL/L
LDL SMALL SERPL-SCNC: 373 NMOL/L
LDLC SERPL CALC-MCNC: 101 MG/DL (ref 0–99)
TRIGL SERPL-MCNC: 199 MG/DL (ref 0–149)

## 2022-06-09 ENCOUNTER — OFFICE VISIT (OUTPATIENT)
Dept: INTERNAL MEDICINE | Facility: CLINIC | Age: 67
End: 2022-06-09

## 2022-06-09 VITALS
WEIGHT: 157.6 LBS | DIASTOLIC BLOOD PRESSURE: 60 MMHG | RESPIRATION RATE: 18 BRPM | HEIGHT: 64 IN | BODY MASS INDEX: 26.91 KG/M2 | SYSTOLIC BLOOD PRESSURE: 124 MMHG | HEART RATE: 85 BPM | OXYGEN SATURATION: 99 %

## 2022-06-09 DIAGNOSIS — E66.3 OVERWEIGHT: Chronic | ICD-10-CM

## 2022-06-09 DIAGNOSIS — E55.9 VITAMIN D DEFICIENCY: Chronic | ICD-10-CM

## 2022-06-09 DIAGNOSIS — Z23 NEED FOR PNEUMOCOCCAL VACCINATION: ICD-10-CM

## 2022-06-09 DIAGNOSIS — M54.42 CHRONIC LEFT-SIDED LOW BACK PAIN WITH LEFT-SIDED SCIATICA: ICD-10-CM

## 2022-06-09 DIAGNOSIS — R80.9 MICROALBUMINURIA: Chronic | ICD-10-CM

## 2022-06-09 DIAGNOSIS — Z90.11 S/P RIGHT MASTECTOMY: Chronic | ICD-10-CM

## 2022-06-09 DIAGNOSIS — G89.29 CHRONIC LEFT-SIDED LOW BACK PAIN WITH LEFT-SIDED SCIATICA: ICD-10-CM

## 2022-06-09 DIAGNOSIS — F41.8 DEPRESSION WITH ANXIETY: Chronic | ICD-10-CM

## 2022-06-09 DIAGNOSIS — E78.2 MIXED HYPERLIPIDEMIA: Chronic | ICD-10-CM

## 2022-06-09 DIAGNOSIS — M48.061 SPINAL STENOSIS OF LUMBAR REGION WITHOUT NEUROGENIC CLAUDICATION: Chronic | ICD-10-CM

## 2022-06-09 DIAGNOSIS — Z78.9 STATIN INTOLERANCE: Chronic | ICD-10-CM

## 2022-06-09 DIAGNOSIS — Z85.3 HISTORY OF RIGHT BREAST CANCER: Chronic | ICD-10-CM

## 2022-06-09 DIAGNOSIS — E11.9 TYPE 2 DIABETES MELLITUS WITHOUT COMPLICATION, WITHOUT LONG-TERM CURRENT USE OF INSULIN: Primary | Chronic | ICD-10-CM

## 2022-06-09 DIAGNOSIS — Z51.81 THERAPEUTIC DRUG MONITORING: ICD-10-CM

## 2022-06-09 DIAGNOSIS — I10 BENIGN ESSENTIAL HYPERTENSION: Chronic | ICD-10-CM

## 2022-06-09 DIAGNOSIS — Z91.09 MULTIPLE ENVIRONMENTAL ALLERGIES: Chronic | ICD-10-CM

## 2022-06-09 PROCEDURE — 99214 OFFICE O/P EST MOD 30 MIN: CPT | Performed by: INTERNAL MEDICINE

## 2022-06-09 PROCEDURE — G0009 ADMIN PNEUMOCOCCAL VACCINE: HCPCS | Performed by: INTERNAL MEDICINE

## 2022-06-09 PROCEDURE — 90677 PCV20 VACCINE IM: CPT | Performed by: INTERNAL MEDICINE

## 2022-06-09 RX ORDER — GLIPIZIDE 5 MG/1
TABLET, FILM COATED, EXTENDED RELEASE ORAL
Qty: 90 TABLET | Refills: 3 | Status: SHIPPED | OUTPATIENT
Start: 2022-06-09 | End: 2023-01-20 | Stop reason: SDUPTHER

## 2022-06-09 RX ORDER — SITAGLIPTIN AND METFORMIN HYDROCHLORIDE 1000; 50 MG/1; MG/1
TABLET, FILM COATED, EXTENDED RELEASE ORAL
Qty: 180 TABLET | Refills: 3 | Status: SHIPPED | OUTPATIENT
Start: 2022-06-09 | End: 2022-06-10

## 2022-06-09 RX ORDER — BLOOD SUGAR DIAGNOSTIC
STRIP MISCELLANEOUS
COMMUNITY
Start: 2022-05-14 | End: 2022-09-15 | Stop reason: SDUPTHER

## 2022-06-09 RX ORDER — VENLAFAXINE HYDROCHLORIDE 75 MG/1
CAPSULE, EXTENDED RELEASE ORAL
Qty: 90 CAPSULE | Refills: 3 | Status: SHIPPED | OUTPATIENT
Start: 2022-06-09

## 2022-06-09 RX ORDER — LOSARTAN POTASSIUM AND HYDROCHLOROTHIAZIDE 25; 100 MG/1; MG/1
TABLET ORAL
Qty: 90 TABLET | Refills: 3 | Status: SHIPPED | OUTPATIENT
Start: 2022-06-09

## 2022-06-09 RX ORDER — LEVOTHYROXINE SODIUM 0.12 MG/1
TABLET ORAL
Qty: 90 TABLET | Refills: 3 | Status: SHIPPED | OUTPATIENT
Start: 2022-06-09

## 2022-06-09 RX ORDER — MELOXICAM 15 MG/1
15 TABLET ORAL DAILY
Qty: 90 TABLET | Refills: 3 | Status: SHIPPED | OUTPATIENT
Start: 2022-06-09

## 2022-06-09 RX ORDER — ATORVASTATIN CALCIUM 10 MG/1
TABLET, FILM COATED ORAL
Qty: 90 TABLET | Refills: 3 | Status: SHIPPED | OUTPATIENT
Start: 2022-06-09

## 2022-06-09 NOTE — PROGRESS NOTES
06/09/2022    Patient Information  Day Goldstein                                                                                          [address] [place] [address]      1955  [unfilled]  There is no work phone number on file.    Chief Complaint:         History of Present Illness:      Review of Systems   Constitutional: Negative.   HENT: Negative.    Eyes: Negative.    Cardiovascular: Negative.    Respiratory: Negative.    Endocrine: Negative.    Hematologic/Lymphatic: Negative.    Skin: Negative.    Musculoskeletal: Negative.    Gastrointestinal: Negative.    Genitourinary: Negative.    Neurological: Negative.    Psychiatric/Behavioral: Negative.    Allergic/Immunologic: Negative.        Active Problems:    Patient Active Problem List   Diagnosis   • Type 2 diabetes mellitus without complication, without long-term current use of insulin (HCC)   • Hyperlipidemia   • Benign essential hypertension   • History of right breast cancer, 2004--status post mastectomy, chemotherapy, Adriamycin and Cytoxan.  Arimidex.   • S/P right mastectomy   • Therapeutic drug monitoring   • Vitamin D deficiency   • Postmenopausal state   • Depression with anxiety   • Chronic left-sided low back pain with left-sided sciatica   • Obstructive sleep apnea   • Spinal stenosis of lumbar region without neurogenic claudication   • Chronic constipation   • Overweight   • Rosacea   • Statin intolerance, except for Lipitor.  Myalgias   • Multiple environmental allergies   • History of colon polyps   • Microalbuminuria         Past Medical History:   Diagnosis Date   • Benign essential hypertension 1/22/2020   • Chronic constipation 4/4/2022   • Chronic left-sided low back pain with left-sided sciatica 4/4/2022 October 30, 2020--MRI of the lumbar spine reveals no evidence of osseous metastatic disease.  Facet osteophytes and disc bulges resulting in moderate to severe neural foraminal stenosis at L3-L4, L4-L5, and  L5-S1.  There is moderate to severe spinal stenosis at L3-L4 and L4-L5.   • Chronic lower back pain 4/4/2022 October 30, 2020--MRI of the lumbar spine reveals no evidence of osseous metastatic disease.  Facet osteophytes and disc bulges resulting in moderate to severe neural foraminal stenosis at L3-L4, L4-L5, and L5-S1.  There is moderate to severe spinal stenosis at L3-L4 and L4-L5.   • Depression with anxiety 4/1/2022   • History of colon polyps 4/4/2022 August 22, 2018--colonoscopy was normal except for redundant colon.  This was followed up with an air-contrast barium enema which revealed a very long and tortuous colon.  No mass or polyp was noted.   • History of right breast cancer 10/16/2017    3/23/2004: Right mastectomy/axillary node dissection.   • Hyperlipidemia 1/22/2020   • Multiple environmental allergies 4/4/2022   • Obstructive sleep apnea 4/4/2022   • Overweight 4/4/2022   • Postmenopausal state 4/1/2022   • S/P right mastectomy 8/17/2020    3/23/2004: Right mastectomy/axillary node dissection.   • Spinal stenosis of lumbar region without neurogenic claudication 4/4/2022   • Statin intolerance, except for Lipitor.  Myalgias 4/4/2022   • Type 2 diabetes mellitus without complication, without long-term current use of insulin ([facility]) 1/22/2020   • Vitamin D deficiency 4/1/2022         Past Surgical History:   Procedure Laterality Date   • COLONOSCOPY  08/22/2018 August 22, 2018--colonoscopy was normal except for redundant colon.  This was followed up with an air-contrast barium enema which revealed a very long and tortuous colon.  No mass or polyp was noted.   • HEMORRHOIDECTOMY  01/26/2022 January 26, 2022--excision of internal and external grade 3 hemorrhoids.   • MASTECTOMY MODIFIED RADICAL / SIMPLE / COMPLETE Right 03/23/2004    3/23/2004: Right mastectomy/axillary node dissection.   • TONSILLECTOMY           Allergies   Allergen Reactions   • Hydromorphone Hcl Itching   • Statins Myalgia    • Thimerosal Unknown - High Severity   • Penicillins Itching and Rash           Current Outpatient Medications:   •  atorvastatin (LIPITOR) 10 MG tablet, Take 1 p.o. daily for high cholesterol, Disp: 90 tablet, Rfl: 3  •  CALCIUM & MAGNESIUM CARBONATES PO, Take 1 tablet by mouth Daily., Disp: , Rfl:   •  cetirizine (zyrTEC) 10 MG tablet, Take 10 mg by mouth Daily., Disp: , Rfl:   •  Cholecalciferol (vitamin D3) 125 MCG (5000 UT) capsule capsule, Take 5,000 Units by mouth Daily., Disp: , Rfl:   •  Coenzyme Q10 (CoQ10 Maximum Strength) 400 MG capsule, Take 1 p.o. daily with food as directed, Disp: , Rfl: 0  •  docusate calcium (SURFAK) 240 MG capsule, Take 240 mg by mouth., Disp: , Rfl:   •  guaiFENesin (MUCINEX) 600 MG 12 hr tablet, Take 1,200 mg by mouth., Disp: , Rfl:   •  levothyroxine (SYNTHROID, LEVOTHROID) 125 MCG tablet, Take 1 p.o. daily for low thyroid, Disp: 90 tablet, Rfl: 3  •  loratadine (CLARITIN) 10 MG tablet, Take 10 mg by mouth Daily., Disp: , Rfl:   •  losartan-hydrochlorothiazide (HYZAAR) 100-25 MG per tablet, Take 1 p.o. every morning for high blood pressure, Disp: 90 tablet, Rfl: 3  •  Omega-3 Fatty Acids (FISH OIL) 1000 MG capsule capsule, Take 1 capsule by mouth., Disp: , Rfl:   •  Polyethylene Glycol 3350 (MIRALAX PO), Take  by mouth., Disp: , Rfl:   •  venlafaxine XR (EFFEXOR-XR) 75 MG 24 hr capsule, Take 1 p.o. daily for depression, Disp: 90 capsule, Rfl: 3  •  glipizide (glipiZIDE XL) 5 MG ER tablet, Take 1 p.o. daily for diabetes, Disp: 90 tablet, Rfl: 3  •  meloxicam (MOBIC) 15 MG tablet, Take 1 tablet by mouth Daily., Disp: 90 tablet, Rfl: 3  •  OneTouch Verio test strip, USE TO CHECK BLOOD SUGAR DAILY, Disp: , Rfl:   •  SITagliptin-metFORMIN HCl ER (Janumet XR)  MG tablet, Take 1 p.o. twice daily for diabetes, Disp: 180 tablet, Rfl: 3      Family History   Problem Relation Age of Onset   • Emphysema Mother    • Alcohol abuse Father    • Hyperlipidemia Sister    • Breast cancer  "Sister    • Prostate cancer Brother    • Hypothyroidism Brother          Social History     Socioeconomic History   • Marital status:    Tobacco Use   • Smoking status: Never Smoker   • Smokeless tobacco: Never Used   Vaping Use   • Vaping Use: Never used   Substance and Sexual Activity   • Alcohol use: Not Currently     Comment: Social alcohol consumption,, maybe 2 drinks per month.   • Drug use: Never   • Sexual activity: Yes     Partners: Male         Vitals:    06/09/22 1300   BP: 124/60   Pulse: 85   Resp: 18   SpO2: 99%   Weight: 71.5 kg (157 lb 9.6 oz)   Height: 162.6 cm (64\")        Body mass index is 27.05 kg/m².      Physical Exam:    General: Alert and oriented x 3.  No acute distress.  Mildly overweight normal affect.  HEENT: Pupils equal, round, reactive to light; extraocular movements intact; sclerae nonicteric; pharynx, ear canals and TMs normal.  Neck: Without JVD, thyromegaly, bruit, or adenopathy.  Lungs: Clear to auscultation in all fields.  Heart: Regular rate and rhythm without murmur, rub, gallop, or click.  Abdomen: Soft, nontender, without hepatosplenomegaly or hernia.  Bowel sounds normal.  : Deferred.  Rectal: Deferred.  Extremities: Without clubbing, cyanosis, edema, or pulse deficit.  Neurologic: Intact without focal deficit.  Normal station and gait observed during ingress and egress from the examination room.  Skin: Without significant lesion.  Musculoskeletal: Unremarkable.    Lab/other results:    Thyroid function tests are normal.  CBC normal.  CPK normal.  CMP normal except glucose 188.  Hemoglobin A1c 8.5.  NMR reveals a total cholesterol of 189.  Triglycerides elevated 199.  LDL particle number borderline at 1240.  Small LDL particle number normal at 373.  HDL particle number normal at 35.8.  Microalbumin/creatinine ratio mildly elevated at 33.5.  Vitamin D normal at 72.2.  Urinalysis reveals trace glucose and trace proteins as well as trace leukocyte esterase, but " microscopic exam was negative.  Thyroid function tests were normal.  Hepatitis C antibody screening nonreactive.    Assessment/Plan:     Diagnosis Plan   1. Type 2 diabetes mellitus without complication, without long-term current use of insulin (HCC)  SITagliptin-metFORMIN HCl ER (Janumet XR)  MG tablet    glipizide (glipiZIDE XL) 5 MG ER tablet    Comprehensive Metabolic Panel    CK    NMR LipoProfile    Hemoglobin A1c   2. Hyperlipidemia  CK    NMR LipoProfile    atorvastatin (LIPITOR) 10 MG tablet    levothyroxine (SYNTHROID, LEVOTHROID) 125 MCG tablet   3. Benign essential hypertension  losartan-hydrochlorothiazide (HYZAAR) 100-25 MG per tablet   4. Vitamin D deficiency     5. Statin intolerance, except for Lipitor.  Myalgias     6. History of right breast cancer, 2004--status post mastectomy, chemotherapy, Adriamycin and Cytoxan.  Arimidex.     7. S/P right mastectomy     8. Depression with anxiety  venlafaxine XR (EFFEXOR-XR) 75 MG 24 hr capsule   9. Spinal stenosis of lumbar region without neurogenic claudication     10. Overweight     11. Multiple environmental allergies     12. Therapeutic drug monitoring     13. Microalbuminuria     14. Chronic left-sided low back pain with left-sided sciatica  meloxicam (MOBIC) 15 MG tablet     Patient has type 2 diabetes which is not at goal.  She is not obese but is somewhat overweight and I have strongly encouraged low carbohydrate diet, exercise, and weight loss.  She has hyperlipidemia and has been statin intolerant except for low-dose Lipitor.  A little reluctant to increase her Lipitor dose for fear of potential side effects.  Addition of Zetia might be an option but I would prefer to wait until we get her glucose under better control.  She is currently only taking metformin 1 g twice a day for diabetes.  Medication adjustment is indicated.    Plan is as follows: Patient should go off of Rybelsus.  Restart glipizide.  Stop metformin, start Janumet 50/1001  p.o. twice daily.  Refill other medications were indicated.  I will have patient obtain fasting lab work in 3 months and then follow-up 1 week later.  We can do the wellness visit at that time.  Prevnar 20 given.        Procedures

## 2022-06-10 DIAGNOSIS — E11.9 TYPE 2 DIABETES MELLITUS WITHOUT COMPLICATION, WITHOUT LONG-TERM CURRENT USE OF INSULIN: Primary | ICD-10-CM

## 2022-06-10 RX ORDER — SITAGLIPTIN AND METFORMIN HYDROCHLORIDE 1000; 50 MG/1; MG/1
TABLET, FILM COATED ORAL
Qty: 60 TABLET | Refills: 11 | Status: SHIPPED | OUTPATIENT
Start: 2022-06-10 | End: 2022-10-18 | Stop reason: SDUPTHER

## 2022-06-20 ENCOUNTER — TELEPHONE (OUTPATIENT)
Dept: INTERNAL MEDICINE | Facility: CLINIC | Age: 67
End: 2022-06-20

## 2022-06-20 NOTE — TELEPHONE ENCOUNTER
Caller: Day Goldstein    Relationship: Self    Best call back number: [phone]    What was the call regarding: PATIENT STATED THAT DR SINGH TOLD HER SHE WOULD HAVE SAMPLES WAITING FOR HER AT THE . PATIENT IS NOTIFYING  THAT SHE IS ON HER WAY TO PICK THOSE UP.     Do you require a callback: NO

## 2022-07-19 ENCOUNTER — TELEPHONE (OUTPATIENT)
Dept: INTERNAL MEDICINE | Facility: CLINIC | Age: 67
End: 2022-07-19

## 2022-07-19 NOTE — TELEPHONE ENCOUNTER
Caller: Day Goldstein    Relationship: Self    Best call back number: [phone]     What is the best time to reach you: ANY    Who are you requesting to speak with (clinical staff, provider,  specific staff member): CLINICAL    Do you know the name of the person who called:NONE    What was the call regarding: PATIENT REQUESTING SAMPLE OF RYBELSUS  IF YOU DO CAN HER FRIEND LAURA CAN PICK THEM UP FOR PATIENT?     Do you require a callback: YES

## 2022-08-09 ENCOUNTER — TELEPHONE (OUTPATIENT)
Dept: INTERNAL MEDICINE | Facility: CLINIC | Age: 67
End: 2022-08-09

## 2022-08-09 NOTE — TELEPHONE ENCOUNTER
Caller: Day Goldstein    Relationship to patient: Self    Best call back number: [phone]    Patient is needing: ASKING IF PROVIDER HAS ANY SAMPLES OF RYBELSUS  PLEASE ADVISE

## 2022-08-25 NOTE — TELEPHONE ENCOUNTER
THE PATIENT WOULD ONCE AGAIN LIKE TO KNOW IF THERE ARE SAMPLES AVAILABLE FOR RYBELSUS.     PLEASE ADVISE BY CALLING .

## 2022-09-15 RX ORDER — BLOOD SUGAR DIAGNOSTIC
STRIP MISCELLANEOUS
Qty: 200 EACH | Refills: 3 | Status: SHIPPED | OUTPATIENT
Start: 2022-09-15

## 2022-10-18 DIAGNOSIS — E11.9 TYPE 2 DIABETES MELLITUS WITHOUT COMPLICATION, WITHOUT LONG-TERM CURRENT USE OF INSULIN: ICD-10-CM

## 2022-10-18 RX ORDER — SITAGLIPTIN AND METFORMIN HYDROCHLORIDE 1000; 50 MG/1; MG/1
TABLET, FILM COATED ORAL
Qty: 60 TABLET | Refills: 11 | Status: SHIPPED | OUTPATIENT
Start: 2022-10-18 | End: 2022-10-20

## 2022-10-20 ENCOUNTER — TELEPHONE (OUTPATIENT)
Dept: INTERNAL MEDICINE | Facility: CLINIC | Age: 67
End: 2022-10-20

## 2022-10-20 DIAGNOSIS — E11.9 TYPE 2 DIABETES MELLITUS WITHOUT COMPLICATION, WITHOUT LONG-TERM CURRENT USE OF INSULIN: Primary | Chronic | ICD-10-CM

## 2022-10-20 NOTE — TELEPHONE ENCOUNTER
Caller: Day Goldstein    Relationship: Self    Best call back number: [phone]    What is the best time to reach you: ANYTIME    Who are you requesting to speak with (clinical staff, provider,  specific staff member): CLINICAL    What was the call regarding: PATIENT STATES SHE IS REQUESTING A CALL BACK IN REGARDS TO HER NEEDING HER METFORMIN PRESCRIPTION CALLED IN DUE TO THE JANUMENT COSTING $500.

## 2022-11-16 ENCOUNTER — OFFICE VISIT (OUTPATIENT)
Dept: INTERNAL MEDICINE | Facility: CLINIC | Age: 67
End: 2022-11-16

## 2022-11-16 VITALS
HEART RATE: 89 BPM | BODY MASS INDEX: 27.45 KG/M2 | WEIGHT: 160.8 LBS | OXYGEN SATURATION: 100 % | RESPIRATION RATE: 18 BRPM | HEIGHT: 64 IN | DIASTOLIC BLOOD PRESSURE: 76 MMHG | SYSTOLIC BLOOD PRESSURE: 124 MMHG

## 2022-11-16 DIAGNOSIS — M48.061 SPINAL STENOSIS OF LUMBAR REGION WITHOUT NEUROGENIC CLAUDICATION: Chronic | ICD-10-CM

## 2022-11-16 DIAGNOSIS — Z86.16 HISTORY OF COVID-19: ICD-10-CM

## 2022-11-16 DIAGNOSIS — E11.9 TYPE 2 DIABETES MELLITUS WITHOUT COMPLICATION, WITHOUT LONG-TERM CURRENT USE OF INSULIN: Chronic | ICD-10-CM

## 2022-11-16 DIAGNOSIS — Z86.010 HISTORY OF COLON POLYPS: Chronic | ICD-10-CM

## 2022-11-16 DIAGNOSIS — E66.3 OVERWEIGHT: Chronic | ICD-10-CM

## 2022-11-16 DIAGNOSIS — E78.2 MIXED HYPERLIPIDEMIA: Chronic | ICD-10-CM

## 2022-11-16 DIAGNOSIS — Z78.9 STATIN INTOLERANCE: Chronic | ICD-10-CM

## 2022-11-16 DIAGNOSIS — R80.9 MICROALBUMINURIA: Chronic | ICD-10-CM

## 2022-11-16 DIAGNOSIS — E55.9 VITAMIN D DEFICIENCY: Chronic | ICD-10-CM

## 2022-11-16 DIAGNOSIS — Z23 NEED FOR INFLUENZA VACCINATION: ICD-10-CM

## 2022-11-16 DIAGNOSIS — K59.09 CHRONIC CONSTIPATION: Chronic | ICD-10-CM

## 2022-11-16 DIAGNOSIS — Z51.81 THERAPEUTIC DRUG MONITORING: ICD-10-CM

## 2022-11-16 DIAGNOSIS — Z00.00 ENCOUNTER FOR SUBSEQUENT ANNUAL WELLNESS VISIT (AWV) IN MEDICARE PATIENT: Primary | ICD-10-CM

## 2022-11-16 DIAGNOSIS — G89.29 CHRONIC LEFT-SIDED LOW BACK PAIN WITH LEFT-SIDED SCIATICA: Chronic | ICD-10-CM

## 2022-11-16 DIAGNOSIS — G47.33 OBSTRUCTIVE SLEEP APNEA: Chronic | ICD-10-CM

## 2022-11-16 DIAGNOSIS — Z90.11 S/P RIGHT MASTECTOMY: Chronic | ICD-10-CM

## 2022-11-16 DIAGNOSIS — Z78.0 POSTMENOPAUSAL STATE: Chronic | ICD-10-CM

## 2022-11-16 DIAGNOSIS — Z12.31 BREAST CANCER SCREENING BY MAMMOGRAM: ICD-10-CM

## 2022-11-16 DIAGNOSIS — Z91.09 MULTIPLE ENVIRONMENTAL ALLERGIES: Chronic | ICD-10-CM

## 2022-11-16 DIAGNOSIS — M54.42 CHRONIC LEFT-SIDED LOW BACK PAIN WITH LEFT-SIDED SCIATICA: Chronic | ICD-10-CM

## 2022-11-16 DIAGNOSIS — F41.8 DEPRESSION WITH ANXIETY: Chronic | ICD-10-CM

## 2022-11-16 DIAGNOSIS — Z85.3 HISTORY OF RIGHT BREAST CANCER: Chronic | ICD-10-CM

## 2022-11-16 DIAGNOSIS — I10 BENIGN ESSENTIAL HYPERTENSION: Chronic | ICD-10-CM

## 2022-11-16 DIAGNOSIS — L71.9 ROSACEA: Chronic | ICD-10-CM

## 2022-11-16 PROCEDURE — G0008 ADMIN INFLUENZA VIRUS VAC: HCPCS | Performed by: INTERNAL MEDICINE

## 2022-11-16 PROCEDURE — G0439 PPPS, SUBSEQ VISIT: HCPCS | Performed by: INTERNAL MEDICINE

## 2022-11-16 PROCEDURE — 1159F MED LIST DOCD IN RCRD: CPT | Performed by: INTERNAL MEDICINE

## 2022-11-16 PROCEDURE — 90662 IIV NO PRSV INCREASED AG IM: CPT | Performed by: INTERNAL MEDICINE

## 2022-11-16 PROCEDURE — 1170F FXNL STATUS ASSESSED: CPT | Performed by: INTERNAL MEDICINE

## 2022-11-16 NOTE — PROGRESS NOTES
The ABCs of the Annual Wellness Visit  Subsequent Medicare Wellness Visit    No chief complaint on file.     Subjective    History of Present Illness:  Day Goldstein is a 67 y.o. female who presents for a Subsequent Medicare Wellness Visit.    The following portions of the patient's history were reviewed and   updated as appropriate: allergies, current medications, past family history, past medical history, past social history, past surgical history and problem list.    Compared to one year ago, the patient feels her physical   health is the same.    Compared to one year ago, the patient feels her mental   health is better.    Recent Hospitalizations:  She was not admitted to the hospital during the last year.       Current Medical Providers:  Patient Care Team:  Bennett Barcenas MD as PCP - General (Internal Medicine)    Outpatient Medications Prior to Visit   Medication Sig Dispense Refill   • atorvastatin (LIPITOR) 10 MG tablet Take 1 p.o. daily for high cholesterol 90 tablet 3   • CALCIUM & MAGNESIUM CARBONATES PO Take 1 tablet by mouth Daily.     • cetirizine (zyrTEC) 10 MG tablet Take 10 mg by mouth Daily.     • Cholecalciferol (vitamin D3) 125 MCG (5000 UT) capsule capsule Take 5,000 Units by mouth Daily.     • Coenzyme Q10 (CoQ10 Maximum Strength) 400 MG capsule Take 1 p.o. daily with food as directed  0   • docusate calcium (SURFAK) 240 MG capsule Take 240 mg by mouth.     • glipizide (glipiZIDE XL) 5 MG ER tablet Take 1 p.o. daily for diabetes 90 tablet 3   • guaiFENesin (MUCINEX) 600 MG 12 hr tablet Take 1,200 mg by mouth.     • levothyroxine (SYNTHROID, LEVOTHROID) 125 MCG tablet Take 1 p.o. daily for low thyroid 90 tablet 3   • loratadine (CLARITIN) 10 MG tablet Take 10 mg by mouth Daily.     • losartan-hydrochlorothiazide (HYZAAR) 100-25 MG per tablet Take 1 p.o. every morning for high blood pressure 90 tablet 3   • meloxicam (MOBIC) 15 MG tablet Take 1 tablet by mouth Daily. 90 tablet 3   •  metFORMIN (GLUCOPHAGE) 1000 MG tablet Take 1 tablet by mouth 2 (Two) Times a Day With Meals. 180 tablet 2   • Omega-3 Fatty Acids (FISH OIL) 1000 MG capsule capsule Take 1 capsule by mouth.     • OneTouch Verio test strip Use as instructed, testing twice( 2 ) daily 200 each 3   • Polyethylene Glycol 3350 (MIRALAX PO) Take  by mouth.     • venlafaxine XR (EFFEXOR-XR) 75 MG 24 hr capsule Take 1 p.o. daily for depression 90 capsule 3     No facility-administered medications prior to visit.       No opioid medication identified on active medication list. I have reviewed chart for other potential  high risk medication/s and harmful drug interactions in the elderly.          Aspirin is not on active medication list.  Aspirin use is not indicated based on review of current medical condition/s. Risk of harm outweighs potential benefits.  .    Patient Active Problem List   Diagnosis   • Type 2 diabetes mellitus without complication, without long-term current use of insulin (HCC)   • Hyperlipidemia   • Benign essential hypertension   • History of right breast cancer, 2004--status post mastectomy, chemotherapy, Adriamycin and Cytoxan.  Arimidex.   • S/P right mastectomy   • Therapeutic drug monitoring   • Vitamin D deficiency   • Postmenopausal state   • Depression with anxiety   • Chronic left-sided low back pain with left-sided sciatica   • Obstructive sleep apnea   • Spinal stenosis of lumbar region without neurogenic claudication   • Chronic constipation   • Overweight   • Rosacea   • Statin intolerance, except for Lipitor.  Myalgias   • Multiple environmental allergies   • History of colon polyps   • Microalbuminuria   • History of COVID-19     Advance Care Planning  Advance Directive is not on file.  ACP discussion was held with the patient during this visit. Patient does not have an advance directive, information provided.    Review of Systems   Constitutional: Negative.    HENT: Negative.    Respiratory: Negative.   "  Cardiovascular: Negative.    Gastrointestinal: Negative.    Endocrine: Negative.    Genitourinary: Negative.    Musculoskeletal: Negative.    Skin: Negative.    Allergic/Immunologic: Negative.    Neurological: Negative.    Hematological: Negative.    Psychiatric/Behavioral: Negative.         Objective    There were no vitals filed for this visit.  Estimated body mass index is 27.05 kg/m² as calculated from the following:    Height as of 6/9/22: 162.6 cm (64\").    Weight as of 6/9/22: 71.5 kg (157 lb 9.6 oz).    BMI is >= 25 and <30. (Overweight) The following options were offered after discussion;: exercise counseling/recommendations and nutrition counseling/recommendations      Does the patient have evidence of cognitive impairment? No    Physical Exam     General: Alert and oriented x 3.  No acute distress.  Normal affect.  HEENT: Pupils equal, round, reactive to light; extraocular movements intact; sclerae nonicteric; pharynx, ear canals and TMs normal.  Neck: Without JVD, thyromegaly, bruit, or adenopathy.  Lungs: Clear to auscultation in all fields.  Heart: Regular rate and rhythm without murmur, rub, gallop, or click.  Abdomen: Soft, nontender, without hepatosplenomegaly or hernia.  Bowel sounds normal.  : Deferred.  Rectal: Deferred.  Extremities: Without clubbing, cyanosis, edema, or pulse deficit.  Neurologic: Intact without focal deficit.  Normal station and gait observed during ingress and egress from the examination room.  Skin: Without significant lesion.  Musculoskeletal: Unremarkable.      HEALTH RISK ASSESSMENT    Smoking Status:  Social History     Tobacco Use   Smoking Status Never   Smokeless Tobacco Never     Alcohol Consumption:  Social History     Substance and Sexual Activity   Alcohol Use Not Currently    Comment: Social alcohol consumption,, maybe 2 drinks per month.     Fall Risk Screen:    STEADI Fall Risk Assessment was completed, and patient is at LOW risk for falls.Assessment " completed on:4/4/2022    Depression Screening:  PHQ-2/PHQ-9 Depression Screening 4/4/2022   Little Interest or Pleasure in Doing Things 0-->not at all   Feeling Down, Depressed or Hopeless 0-->not at all   PHQ-9: Brief Depression Severity Measure Score 0       Health Habits and Functional and Cognitive Screening:  No flowsheet data found.    Age-appropriate Screening Schedule:  Refer to the list below for future screening recommendations based on patient's age, sex and/or medical conditions. Orders for these recommended tests are listed in the plan section. The patient has been provided with a written plan.    Health Maintenance   Topic Date Due   • DIABETIC FOOT EXAM  Never done   • INFLUENZA VACCINE  08/01/2022   • MAMMOGRAM  10/30/2022   • ZOSTER VACCINE (3 of 3) 03/31/2023 (Originally 9/24/2019)   • HEMOGLOBIN A1C  12/04/2022   • DIABETIC EYE EXAM  02/25/2023   • LIPID PANEL  06/04/2023   • URINE MICROALBUMIN  06/04/2023   • DXA SCAN  05/05/2024   • TDAP/TD VACCINES (3 - Td or Tdap) 02/24/2027              Assessment & Plan   CMS Preventative Services Quick Reference  Risk Factors Identified During Encounter  Cardiovascular Disease  Immunizations Discussed/Encouraged (specific Immunizations; COVID19  Obesity/Overweight   The above risks/problems have been discussed with the patient.  Follow up actions/plans if indicated are seen below in the Assessment/Plan Section.  Pertinent information has been shared with the patient in the After Visit Summary.    Diagnoses and all orders for this visit:    1. Encounter for subsequent annual wellness visit (AWV) in Medicare patient (Primary)    2. Type 2 diabetes mellitus without complication, without long-term current use of insulin (HCC)    3. Hyperlipidemia    4. Benign essential hypertension    5. History of right breast cancer, 2004--status post mastectomy, chemotherapy, Adriamycin and Cytoxan.  Arimidex.    6. S/P right mastectomy    7. Vitamin D deficiency    8.  Postmenopausal state    9. Depression with anxiety    10. Chronic left-sided low back pain with left-sided sciatica    11. Obstructive sleep apnea    12. Spinal stenosis of lumbar region without neurogenic claudication    13. Chronic constipation    14. Overweight    15. Rosacea    16. Statin intolerance, except for Lipitor.  Myalgias    17. Multiple environmental allergies    18. History of colon polyps    19. Microalbuminuria    20. Therapeutic drug monitoring    21. History of COVID-19    22. Breast cancer screening by mammogram  -     Mammo Screening Bilateral With CAD; Future        Follow Up:   No follow-ups on file.     An After Visit Summary and PPPS were made available to the patient.

## 2022-11-18 ENCOUNTER — TRANSCRIBE ORDERS (OUTPATIENT)
Dept: ADMINISTRATIVE | Facility: HOSPITAL | Age: 67
End: 2022-11-18

## 2022-11-18 DIAGNOSIS — Z12.31 SCREENING MAMMOGRAM, ENCOUNTER FOR: Primary | ICD-10-CM

## 2022-11-18 LAB
ALBUMIN SERPL-MCNC: 4.7 G/DL (ref 3.8–4.8)
ALBUMIN/GLOB SERPL: 2 {RATIO} (ref 1.2–2.2)
ALP SERPL-CCNC: 67 IU/L (ref 44–121)
ALT SERPL-CCNC: 17 IU/L (ref 0–32)
AST SERPL-CCNC: 17 IU/L (ref 0–40)
BILIRUB SERPL-MCNC: 0.4 MG/DL (ref 0–1.2)
BUN SERPL-MCNC: 15 MG/DL (ref 8–27)
BUN/CREAT SERPL: 22 (ref 12–28)
CALCIUM SERPL-MCNC: 10.1 MG/DL (ref 8.7–10.3)
CHLORIDE SERPL-SCNC: 100 MMOL/L (ref 96–106)
CHOLEST SERPL-MCNC: 188 MG/DL (ref 100–199)
CK SERPL-CCNC: 75 U/L (ref 32–182)
CO2 SERPL-SCNC: 27 MMOL/L (ref 20–29)
CREAT SERPL-MCNC: 0.67 MG/DL (ref 0.57–1)
EGFRCR SERPLBLD CKD-EPI 2021: 95 ML/MIN/1.73
GLOBULIN SER CALC-MCNC: 2.4 G/DL (ref 1.5–4.5)
GLUCOSE SERPL-MCNC: 136 MG/DL (ref 70–99)
HBA1C MFR BLD: 8.1 % (ref 4.8–5.6)
HDL SERPL-SCNC: 36.2 UMOL/L
HDLC SERPL-MCNC: 56 MG/DL
LDL SERPL QN: 20.1 NM
LDL SERPL-SCNC: 1357 NMOL/L
LDL SMALL SERPL-SCNC: 855 NMOL/L
LDLC SERPL CALC-MCNC: 87 MG/DL (ref 0–99)
POTASSIUM SERPL-SCNC: 4 MMOL/L (ref 3.5–5.2)
PROT SERPL-MCNC: 7.1 G/DL (ref 6–8.5)
SODIUM SERPL-SCNC: 140 MMOL/L (ref 134–144)
TRIGL SERPL-MCNC: 276 MG/DL (ref 0–149)

## 2022-12-12 ENCOUNTER — APPOINTMENT (OUTPATIENT)
Dept: MAMMOGRAPHY | Facility: HOSPITAL | Age: 67
End: 2022-12-12

## 2022-12-12 ENCOUNTER — HOSPITAL ENCOUNTER (OUTPATIENT)
Dept: MAMMOGRAPHY | Facility: HOSPITAL | Age: 67
Discharge: HOME | End: 2022-12-12
Admitting: INTERNAL MEDICINE

## 2022-12-12 DIAGNOSIS — Z12.31 SCREENING MAMMOGRAM, ENCOUNTER FOR: ICD-10-CM

## 2022-12-12 PROCEDURE — 77063 BREAST TOMOSYNTHESIS BI: CPT

## 2022-12-12 PROCEDURE — 77067 SCR MAMMO BI INCL CAD: CPT

## 2023-01-20 DIAGNOSIS — E11.9 TYPE 2 DIABETES MELLITUS WITHOUT COMPLICATION, WITHOUT LONG-TERM CURRENT USE OF INSULIN: Chronic | ICD-10-CM

## 2023-01-20 NOTE — TELEPHONE ENCOUNTER
Caller: Day Goldstein    Relationship: Self    Best call back number: [phone]    Requested Prescriptions:   Requested Prescriptions     Pending Prescriptions Disp Refills   • glipizide (glipiZIDE XL) 5 MG ER tablet 90 tablet 3     Sig: Take 1 p.o. daily for diabetes        Pharmacy where request should be sent: [id number] - [place], [place] - [address] AT [address] OF [facility] [address] [facility] - [phone] [facility] [phone]      Additional details provided by patient: OUT OF MEDICATION. PATIENT ASKS IF SHE IS SUPPOSED TO BE TAKING THIS MEDICATION? PLEASE ADVISE     Does the patient have less than a 3 day supply:  [x] Yes  [] No    Would you like a call back once the refill request has been completed: [] Yes [] No    If the office needs to give you a call back, can they leave a voicemail: [] Yes [] No    Myriam Victoria Rep   01/20/23 16:00 EST

## 2023-01-23 RX ORDER — GLIPIZIDE 5 MG/1
TABLET, FILM COATED, EXTENDED RELEASE ORAL
Qty: 90 TABLET | Refills: 3 | Status: SHIPPED | OUTPATIENT
Start: 2023-01-23

## 2023-04-07 DIAGNOSIS — G89.29 CHRONIC LEFT-SIDED LOW BACK PAIN WITH LEFT-SIDED SCIATICA: ICD-10-CM

## 2023-04-07 DIAGNOSIS — I10 BENIGN ESSENTIAL HYPERTENSION: Chronic | ICD-10-CM

## 2023-04-07 DIAGNOSIS — M54.42 CHRONIC LEFT-SIDED LOW BACK PAIN WITH LEFT-SIDED SCIATICA: ICD-10-CM

## 2023-04-07 DIAGNOSIS — F41.8 DEPRESSION WITH ANXIETY: Chronic | ICD-10-CM

## 2023-04-07 DIAGNOSIS — E78.2 MIXED HYPERLIPIDEMIA: Chronic | ICD-10-CM

## 2023-04-07 RX ORDER — LEVOTHYROXINE SODIUM 0.12 MG/1
TABLET ORAL
Qty: 90 TABLET | Refills: 3 | Status: SHIPPED | OUTPATIENT
Start: 2023-04-07

## 2023-04-12 RX ORDER — LOSARTAN POTASSIUM AND HYDROCHLOROTHIAZIDE 25; 100 MG/1; MG/1
TABLET ORAL
Qty: 90 TABLET | Refills: 3 | Status: SHIPPED | OUTPATIENT
Start: 2023-04-12

## 2023-04-12 RX ORDER — MELOXICAM 15 MG/1
TABLET ORAL
Qty: 90 TABLET | Refills: 3 | Status: SHIPPED | OUTPATIENT
Start: 2023-04-12

## 2023-04-12 RX ORDER — VENLAFAXINE HYDROCHLORIDE 75 MG/1
CAPSULE, EXTENDED RELEASE ORAL
Qty: 90 CAPSULE | Refills: 3 | Status: SHIPPED | OUTPATIENT
Start: 2023-04-12

## 2023-05-02 ENCOUNTER — TELEPHONE (OUTPATIENT)
Dept: INTERNAL MEDICINE | Facility: CLINIC | Age: 68
End: 2023-05-02

## 2023-05-02 NOTE — TELEPHONE ENCOUNTER
Caller: Day Goldstein    Relationship: Self    Best call back number: [phone]    What is the best time to reach you: ANYTIME     Who are you requesting to speak with (clinical staff, provider,  specific staff member): CLINICAL STAFF  Do you know the name of the person who called: SELF   What was the call regarding: PAT[facility] CALLED AND STATED [facility] AND WANTED TO KNOW WHAT WAS THE NEXT STEP.   Do you require a callback: YES

## 2023-05-09 NOTE — TELEPHONE ENCOUNTER
SPOKE WITH PT SHE REQUESTED SAMPLES. WE DO NOT HAVE ANY SAMPLES. SHE DID NOT WANT TO CALL ANY IN BECAUSE SHE SAID IT IS OVER 500 DOLLARS AT HER PHARMACY.

## 2023-10-07 DIAGNOSIS — E11.9 TYPE 2 DIABETES MELLITUS WITHOUT COMPLICATION, WITHOUT LONG-TERM CURRENT USE OF INSULIN: Chronic | ICD-10-CM

## 2023-11-09 RX ORDER — BLOOD-GLUCOSE METER
EACH MISCELLANEOUS
Qty: 200 EACH | Refills: 0 | Status: SHIPPED | OUTPATIENT
Start: 2023-11-09

## 2024-01-14 DIAGNOSIS — E11.9 TYPE 2 DIABETES MELLITUS WITHOUT COMPLICATION, WITHOUT LONG-TERM CURRENT USE OF INSULIN: Chronic | ICD-10-CM

## 2024-01-15 RX ORDER — GLIPIZIDE 5 MG/1
TABLET, FILM COATED, EXTENDED RELEASE ORAL
Qty: 30 TABLET | Refills: 0 | Status: SHIPPED | OUTPATIENT
Start: 2024-01-15

## 2024-01-15 NOTE — TELEPHONE ENCOUNTER
Rx Refill Note  Requested Prescriptions     Pending Prescriptions Disp Refills    metFORMIN (GLUCOPHAGE) 1000 MG tablet [Pharmacy Med Name: METFORMIN 1000MG TABLETS] 180 tablet 0     Sig: TAKE 1 TABLET BY MOUTH TWICE DAILY WITH MEALS    glipizide (GLUCOTROL XL) 5 MG ER tablet [Pharmacy Med Name: GLIPIZIDE ER 5MG TABLETS] 90 tablet 3     Sig: TAKE 1 TABLET BY MOUTH DAILY FOR DIABETES      Last office visit with prescribing clinician: 11/16/2022   Last telemedicine visit with prescribing     Addison Hurtado MA  01/15/24, 07:51 EST

## 2024-02-12 DIAGNOSIS — E11.9 TYPE 2 DIABETES MELLITUS WITHOUT COMPLICATION, WITHOUT LONG-TERM CURRENT USE OF INSULIN: Chronic | ICD-10-CM

## 2024-02-13 DIAGNOSIS — E11.9 TYPE 2 DIABETES MELLITUS WITHOUT COMPLICATION, WITHOUT LONG-TERM CURRENT USE OF INSULIN: Chronic | ICD-10-CM

## 2024-02-13 RX ORDER — GLIPIZIDE 5 MG/1
TABLET, FILM COATED, EXTENDED RELEASE ORAL
Qty: 30 TABLET | Refills: 0 | Status: SHIPPED | OUTPATIENT
Start: 2024-02-13

## 2024-02-13 RX ORDER — GLIPIZIDE 5 MG/1
TABLET, FILM COATED, EXTENDED RELEASE ORAL
Qty: 90 TABLET | OUTPATIENT
Start: 2024-02-13

## 2024-03-12 ENCOUNTER — OFFICE VISIT (OUTPATIENT)
Dept: INTERNAL MEDICINE | Facility: CLINIC | Age: 69
End: 2024-03-12
Payer: MEDICARE

## 2024-03-12 VITALS
WEIGHT: 155 LBS | DIASTOLIC BLOOD PRESSURE: 72 MMHG | OXYGEN SATURATION: 98 % | HEART RATE: 77 BPM | SYSTOLIC BLOOD PRESSURE: 128 MMHG | TEMPERATURE: 97.8 F | BODY MASS INDEX: 26.46 KG/M2 | RESPIRATION RATE: 16 BRPM | HEIGHT: 64 IN

## 2024-03-12 DIAGNOSIS — Z90.11 S/P RIGHT MASTECTOMY: Chronic | ICD-10-CM

## 2024-03-12 DIAGNOSIS — R80.9 MICROALBUMINURIA: Chronic | ICD-10-CM

## 2024-03-12 DIAGNOSIS — Z51.81 THERAPEUTIC DRUG MONITORING: ICD-10-CM

## 2024-03-12 DIAGNOSIS — L71.9 ROSACEA: Chronic | ICD-10-CM

## 2024-03-12 DIAGNOSIS — F41.8 DEPRESSION WITH ANXIETY: Chronic | ICD-10-CM

## 2024-03-12 DIAGNOSIS — M48.061 SPINAL STENOSIS OF LUMBAR REGION WITHOUT NEUROGENIC CLAUDICATION: Chronic | ICD-10-CM

## 2024-03-12 DIAGNOSIS — Z86.16 HISTORY OF COVID-19: Chronic | ICD-10-CM

## 2024-03-12 DIAGNOSIS — G89.29 CHRONIC LEFT-SIDED LOW BACK PAIN WITH LEFT-SIDED SCIATICA: Chronic | ICD-10-CM

## 2024-03-12 DIAGNOSIS — E11.9 TYPE 2 DIABETES MELLITUS WITHOUT COMPLICATION, WITHOUT LONG-TERM CURRENT USE OF INSULIN: Chronic | ICD-10-CM

## 2024-03-12 DIAGNOSIS — E78.2 MIXED HYPERLIPIDEMIA: Chronic | ICD-10-CM

## 2024-03-12 DIAGNOSIS — Z85.3 HISTORY OF RIGHT BREAST CANCER: Chronic | ICD-10-CM

## 2024-03-12 DIAGNOSIS — K59.09 CHRONIC CONSTIPATION: Chronic | ICD-10-CM

## 2024-03-12 DIAGNOSIS — Q43.8 TORTUOUS COLON: Chronic | ICD-10-CM

## 2024-03-12 DIAGNOSIS — Z86.010 HISTORY OF COLON POLYPS: Chronic | ICD-10-CM

## 2024-03-12 DIAGNOSIS — G47.33 OBSTRUCTIVE SLEEP APNEA: Chronic | ICD-10-CM

## 2024-03-12 DIAGNOSIS — I10 BENIGN ESSENTIAL HYPERTENSION: Chronic | ICD-10-CM

## 2024-03-12 DIAGNOSIS — Z91.09 MULTIPLE ENVIRONMENTAL ALLERGIES: Chronic | ICD-10-CM

## 2024-03-12 DIAGNOSIS — Z78.0 POSTMENOPAUSAL STATE: Chronic | ICD-10-CM

## 2024-03-12 DIAGNOSIS — E55.9 VITAMIN D DEFICIENCY: Chronic | ICD-10-CM

## 2024-03-12 DIAGNOSIS — M54.42 CHRONIC LEFT-SIDED LOW BACK PAIN WITH LEFT-SIDED SCIATICA: Chronic | ICD-10-CM

## 2024-03-12 DIAGNOSIS — E66.3 OVERWEIGHT: Chronic | ICD-10-CM

## 2024-03-12 DIAGNOSIS — Z00.00 ENCOUNTER FOR SUBSEQUENT ANNUAL WELLNESS VISIT (AWV) IN MEDICARE PATIENT: Primary | ICD-10-CM

## 2024-03-12 DIAGNOSIS — Z78.9 STATIN INTOLERANCE: Chronic | ICD-10-CM

## 2024-03-12 DIAGNOSIS — Z12.11 COLON CANCER SCREENING: ICD-10-CM

## 2024-03-12 RX ORDER — LIRAGLUTIDE 6 MG/ML
INJECTION SUBCUTANEOUS
Start: 2024-03-12

## 2024-03-12 RX ORDER — DAPAGLIFLOZIN 10 MG/1
TABLET, FILM COATED ORAL
Start: 2024-03-12

## 2024-03-12 RX ORDER — OLMESARTAN MEDOXOMIL AND HYDROCHLOROTHIAZIDE 40/12.5 40; 12.5 MG/1; MG/1
TABLET ORAL
Start: 2024-03-12

## 2024-03-12 NOTE — PROGRESS NOTES
The ABCs of the Annual Wellness Visit  Subsequent Medicare Wellness Visit    Subjective      Day Goldstein is a 68 y.o. female who presents for a Subsequent Medicare Wellness Visit.    The following portions of the patient's history were reviewed and   updated as appropriate: allergies, current medications, past family history, past medical history, past social history, past surgical history, and problem list.    Compared to one year ago, the patient feels her physical   health is the same.    Compared to one year ago, the patient feels her mental   health is the same.    Recent Hospitalizations:  She was not admitted to the hospital during the last year.       Current Medical Providers:  Patient Care Team:  Bennett Barcenas MD as PCP - General (Internal Medicine)    Outpatient Medications Prior to Visit   Medication Sig Dispense Refill    atorvastatin (LIPITOR) 10 MG tablet Take 1 p.o. daily for high cholesterol 90 tablet 3    CALCIUM & MAGNESIUM CARBONATES PO Take 1 tablet by mouth Daily.      cetirizine (zyrTEC) 10 MG tablet Take 1 tablet by mouth Daily.      Cholecalciferol (vitamin D3) 125 MCG (5000 UT) capsule capsule Take 1 capsule by mouth Daily.      Coenzyme Q10 (CoQ10 Maximum Strength) 400 MG capsule Take 1 p.o. daily with food as directed  0    docusate calcium (SURFAK) 240 MG capsule Take 1 capsule by mouth.      glipizide (GLUCOTROL XL) 5 MG ER tablet TAKE 1 TABLET BY MOUTH DAILY FOR DIABETES 30 tablet 0    guaiFENesin (MUCINEX) 600 MG 12 hr tablet Take 2 tablets by mouth.      levothyroxine (SYNTHROID, LEVOTHROID) 125 MCG tablet TAKE 1 TABLET DAILY FOR LOWTHYROID 90 tablet 3    loratadine (CLARITIN) 10 MG tablet Take 1 tablet by mouth Daily.      metFORMIN (GLUCOPHAGE) 1000 MG tablet TAKE 1 TABLET BY MOUTH TWICE DAILY WITH MEALS 60 tablet 0    Omega-3 Fatty Acids (FISH OIL) 1000 MG capsule capsule Take 1 capsule by mouth.      OneTouch Verio test strip USE TO TEST BLOOD GLUCOSE TWICE DAILY 200  "each 0    Polyethylene Glycol 3350 (MIRALAX PO) Take  by mouth.      venlafaxine XR (EFFEXOR-XR) 75 MG 24 hr capsule TAKE 1 CAPSULE DAILY FOR   DEPRESSION 90 capsule 3     No facility-administered medications prior to visit.       No opioid medication identified on active medication list. I have reviewed chart for other potential  high risk medication/s and harmful drug interactions in the elderly.        Aspirin is not on active medication list.  Aspirin use is not indicated based on review of current medical condition/s. Risk of harm outweighs potential benefits.  .    Patient Active Problem List   Diagnosis    Type 2 diabetes mellitus without complication, without long-term current use of insulin    Hyperlipidemia    Benign essential hypertension    History of right breast cancer, 2004--status post mastectomy, chemotherapy, Adriamycin and Cytoxan.  Arimidex.    S/P right mastectomy    Therapeutic drug monitoring    Vitamin D deficiency    Postmenopausal state    Depression with anxiety    Chronic left-sided low back pain with left-sided sciatica    Obstructive sleep apnea    Spinal stenosis of lumbar region without neurogenic claudication    Chronic constipation    Overweight    Rosacea    Statin intolerance, except for Lipitor.  Myalgias    Multiple environmental allergies    History of colon polyps    Microalbuminuria    History of COVID-19    Tortuous colon, patient cannot tolerate colonoscopy     Advance Care Planning   Advance Care Planning     Advance Directive is not on file.  ACP discussion was held with the patient during this visit. Patient does not have an advance directive, information provided.     Objective    Vitals:    03/12/24 0947   BP: 128/72   Pulse: 77   Resp: 16   Temp: 97.8 °F (36.6 °C)   TempSrc: Temporal   SpO2: 98%   Weight: 70.3 kg (155 lb)   Height: 162.6 cm (64.02\")     Estimated body mass index is 26.59 kg/m² as calculated from the following:    Height as of this encounter: 162.6 cm " "(64.02\").    Weight as of this encounter: 70.3 kg (155 lb).           Does the patient have evidence of cognitive impairment?   No            HEALTH RISK ASSESSMENT    Smoking Status:  Social History     Tobacco Use   Smoking Status Never   Smokeless Tobacco Never     Alcohol Consumption:  Social History     Substance and Sexual Activity   Alcohol Use Not Currently    Comment: Social alcohol consumption,, maybe 2 drinks per month.     Fall Risk Screen:    RACHEL Fall Risk Assessment was completed, and patient is at LOW risk for falls.Assessment completed on:3/12/2024    Depression Screening:      3/12/2024     9:51 AM   PHQ-2/PHQ-9 Depression Screening   Little Interest or Pleasure in Doing Things 0-->not at all   Feeling Down, Depressed or Hopeless 0-->not at all   PHQ-9: Brief Depression Severity Measure Score 0       Health Habits and Functional and Cognitive Screening:      3/12/2024     9:52 AM   Functional & Cognitive Status   Do you have difficulty preparing food and eating? No   Do you have difficulty bathing yourself, getting dressed or grooming yourself? No   Do you have difficulty using the toilet? No   Do you have difficulty moving around from place to place? No   Do you have trouble with steps or getting out of a bed or a chair? No   Current Diet Well Balanced Diet   Dental Exam Not up to date   Eye Exam Up to date   Exercise (times per week) 4 times per week   Current Exercises Include Walking   Do you need help using the phone?  No   Are you deaf or do you have serious difficulty hearing?  No   Do you need help to go to places out of walking distance? No   Do you need help shopping? No   Do you need help preparing meals?  No   Do you need help with housework?  No   Do you need help with laundry? No   Do you need help taking your medications? No   Do you need help managing money? No   Do you ever drive or ride in a car without wearing a seat belt? No   Have you felt unusual stress, anger or loneliness " in the last month? No   Who do you live with? Spouse   If you need help, do you have trouble finding someone available to you? No   Have you been bothered in the last four weeks by sexual problems? No   Do you have difficulty concentrating, remembering or making decisions? No       Age-appropriate Screening Schedule:  Refer to the list below for future screening recommendations based on patient's age, sex and/or medical conditions. Orders for these recommended tests are listed in the plan section. The patient has been provided with a written plan.    Health Maintenance   Topic Date Due    DIABETIC FOOT EXAM  Never done    URINE MICROALBUMIN  06/04/2023    ANNUAL WELLNESS VISIT  11/16/2023    HEMOGLOBIN A1C  11/17/2023    ZOSTER VACCINE (3 of 3) 03/12/2024 (Originally 9/24/2019)    INFLUENZA VACCINE  03/31/2024 (Originally 8/1/2023)    RSV Vaccine - Adults (1 - 1-dose 60+ series) 03/12/2025 (Originally 8/19/2014)    DXA SCAN  05/05/2024    LIPID PANEL  05/17/2024    MAMMOGRAM  12/14/2024    DIABETIC EYE EXAM  01/31/2025    BMI FOLLOWUP  03/12/2025    TDAP/TD VACCINES (4 - Td or Tdap) 02/27/2027    HEPATITIS C SCREENING  Completed    Pneumococcal Vaccine 65+  Completed    COVID-19 Vaccine  Discontinued    COLORECTAL CANCER SCREENING  Discontinued                  CMS Preventative Services Quick Reference  Risk Factors Identified During Encounter:    Immunizations Discussed/Encouraged: RSV (Respiratory Syncytial Virus)  Urinary Incontinence: Kegel exercises discussed    The above risks/problems have been discussed with the patient.  Pertinent information has been shared with the patient in the After Visit Summary.    Diagnoses and all orders for this visit:    1. Encounter for subsequent annual wellness visit (AWV) in Medicare patient (Primary)    2. Type 2 diabetes mellitus without complication, without long-term current use of insulin  -     Comprehensive Metabolic Panel  -     Hemoglobin A1c  -     Urinalysis With  Microscopic If Indicated (No Culture) - Urine, Clean Catch  -     Liraglutide (Victoza) 18 MG/3ML solution pen-injector injection; Inject 0.6 mL daily as directed for diabetes  -     dapagliflozin Propanediol (Farxiga) 10 MG tablet; Take 1 p.o. daily before the largest meal for diabetes    3. Microalbuminuria  -     Microalbumin / Creatinine Urine Ratio - Urine, Clean Catch  -     olmesartan-hydrochlorothiazide (BENICAR HCT) 40-12.5 MG per tablet; Take 1 p.o. daily for blood pressure and kidney protection    4. History of COVID-19  -     SARS-CoV-2 Antibodies, Nucleocapsid (Natural Immunity)    5. Vitamin D deficiency  -     Vitamin D,25-Hydroxy    6. Statin intolerance, except for Lipitor.  Myalgias    7. Benign essential hypertension  -     olmesartan-hydrochlorothiazide (BENICAR HCT) 40-12.5 MG per tablet; Take 1 p.o. daily for blood pressure and kidney protection    8. Chronic left-sided low back pain with left-sided sciatica    9. Spinal stenosis of lumbar region without neurogenic claudication    10. S/P right mastectomy    11. History of right breast cancer, 2004--status post mastectomy, chemotherapy, Adriamycin and Cytoxan.  Arimidex.    12. Hyperlipidemia  -     CK  -     Comprehensive Metabolic Panel  -     NMR LipoProfile  -     TSH  -     T4, Free  -     T3, Free    13. Multiple environmental allergies    14. Obstructive sleep apnea    15. Overweight    16. Postmenopausal state    17. Rosacea    18. History of colon polyps  -     Cancel: Ambulatory Referral For Screening Colonoscopy    19. Depression with anxiety    20. Chronic constipation    21. Therapeutic drug monitoring  -     CBC (No Diff)    22. Colon cancer screening  -     Cologuard - Stool, Per Rectum; Future    23. Tortuous colon, patient cannot tolerate colonoscopy        Follow Up:   Next Medicare Wellness visit to be scheduled in 1 year.      An After Visit Summary and PPPS were made available to the patient.

## 2024-03-15 DIAGNOSIS — E11.9 TYPE 2 DIABETES MELLITUS WITHOUT COMPLICATION, WITHOUT LONG-TERM CURRENT USE OF INSULIN: Chronic | ICD-10-CM

## 2024-03-15 LAB
25(OH)D3+25(OH)D2 SERPL-MCNC: 47.1 NG/ML (ref 30–100)
ALBUMIN SERPL-MCNC: 5.1 G/DL (ref 3.9–4.9)
ALBUMIN/CREAT UR: 24 MG/G CREAT (ref 0–29)
ALBUMIN/GLOB SERPL: 2.1 {RATIO} (ref 1.2–2.2)
ALP SERPL-CCNC: 66 IU/L (ref 44–121)
ALT SERPL-CCNC: 11 IU/L (ref 0–32)
APPEARANCE UR: CLEAR
AST SERPL-CCNC: 12 IU/L (ref 0–40)
BILIRUB SERPL-MCNC: 0.7 MG/DL (ref 0–1.2)
BILIRUB UR QL STRIP: NEGATIVE
BUN SERPL-MCNC: 24 MG/DL (ref 8–27)
BUN/CREAT SERPL: 33 (ref 12–28)
CALCIUM SERPL-MCNC: 10.8 MG/DL (ref 8.7–10.3)
CHLORIDE SERPL-SCNC: 99 MMOL/L (ref 96–106)
CHOLEST SERPL-MCNC: 242 MG/DL (ref 100–199)
CK SERPL-CCNC: 92 U/L (ref 32–182)
CO2 SERPL-SCNC: 25 MMOL/L (ref 20–29)
COLOR UR: YELLOW
CREAT SERPL-MCNC: 0.73 MG/DL (ref 0.57–1)
CREAT UR-MCNC: 43.9 MG/DL
EGFRCR SERPLBLD CKD-EPI 2021: 89 ML/MIN/1.73
ERYTHROCYTE [DISTWIDTH] IN BLOOD BY AUTOMATED COUNT: 12.1 % (ref 11.7–15.4)
GLOBULIN SER CALC-MCNC: 2.4 G/DL (ref 1.5–4.5)
GLUCOSE SERPL-MCNC: 124 MG/DL (ref 70–99)
GLUCOSE UR QL STRIP: ABNORMAL
HBA1C MFR BLD: 7.1 % (ref 4.8–5.6)
HCT VFR BLD AUTO: 44.5 % (ref 34–46.6)
HDL SERPL-SCNC: 29.2 UMOL/L
HDLC SERPL-MCNC: 57 MG/DL
HGB BLD-MCNC: 14.7 G/DL (ref 11.1–15.9)
HGB UR QL STRIP: NEGATIVE
KETONES UR QL STRIP: NEGATIVE
LDL SERPL QN: 20.5 NM
LDL SERPL-SCNC: 2109 NMOL/L
LDL SMALL SERPL-SCNC: 885 NMOL/L
LDLC SERPL CALC-MCNC: 119 MG/DL (ref 0–99)
LEUKOCYTE ESTERASE UR QL STRIP: NEGATIVE
MCH RBC QN AUTO: 28.4 PG (ref 26.6–33)
MCHC RBC AUTO-ENTMCNC: 33 G/DL (ref 31.5–35.7)
MCV RBC AUTO: 86 FL (ref 79–97)
MICRO URNS: ABNORMAL
MICROALBUMIN UR-MCNC: 10.4 UG/ML
NITRITE UR QL STRIP: NEGATIVE
PH UR STRIP: 6.5 [PH] (ref 5–7.5)
PLATELET # BLD AUTO: 259 X10E3/UL (ref 150–450)
POTASSIUM SERPL-SCNC: 4.3 MMOL/L (ref 3.5–5.2)
PROT SERPL-MCNC: 7.5 G/DL (ref 6–8.5)
PROT UR QL STRIP: NEGATIVE
RBC # BLD AUTO: 5.18 X10E6/UL (ref 3.77–5.28)
SARS-COV-2 AB SERPL QL IA: POSITIVE
SODIUM SERPL-SCNC: 141 MMOL/L (ref 134–144)
SP GR UR STRIP: 1.03 (ref 1–1.03)
T3FREE SERPL-MCNC: 2 PG/ML (ref 2–4.4)
T4 FREE SERPL-MCNC: 1.22 NG/DL (ref 0.82–1.77)
TRIGL SERPL-MCNC: 379 MG/DL (ref 0–149)
TSH SERPL DL<=0.005 MIU/L-ACNC: 1.85 UIU/ML (ref 0.45–4.5)
UROBILINOGEN UR STRIP-MCNC: 0.2 MG/DL (ref 0.2–1)
WBC # BLD AUTO: 5.8 X10E3/UL (ref 3.4–10.8)

## 2024-03-15 RX ORDER — GLIPIZIDE 5 MG/1
TABLET, FILM COATED, EXTENDED RELEASE ORAL
Qty: 30 TABLET | Refills: 0 | Status: SHIPPED | OUTPATIENT
Start: 2024-03-15

## 2024-04-09 ENCOUNTER — OFFICE VISIT (OUTPATIENT)
Dept: INTERNAL MEDICINE | Facility: CLINIC | Age: 69
End: 2024-04-09
Payer: MEDICARE

## 2024-04-09 VITALS
DIASTOLIC BLOOD PRESSURE: 70 MMHG | RESPIRATION RATE: 18 BRPM | WEIGHT: 155 LBS | HEIGHT: 64 IN | OXYGEN SATURATION: 97 % | TEMPERATURE: 97.5 F | HEART RATE: 76 BPM | BODY MASS INDEX: 26.46 KG/M2 | SYSTOLIC BLOOD PRESSURE: 110 MMHG

## 2024-04-09 DIAGNOSIS — Q43.8 TORTUOUS COLON: Chronic | ICD-10-CM

## 2024-04-09 DIAGNOSIS — E11.29 TYPE 2 DIABETES MELLITUS WITH DIABETIC MICROALBUMINURIA, WITHOUT LONG-TERM CURRENT USE OF INSULIN: Primary | ICD-10-CM

## 2024-04-09 DIAGNOSIS — F41.8 DEPRESSION WITH ANXIETY: Chronic | ICD-10-CM

## 2024-04-09 DIAGNOSIS — G47.33 OBSTRUCTIVE SLEEP APNEA: Chronic | ICD-10-CM

## 2024-04-09 DIAGNOSIS — Z51.81 THERAPEUTIC DRUG MONITORING: ICD-10-CM

## 2024-04-09 DIAGNOSIS — E55.9 VITAMIN D DEFICIENCY: Chronic | ICD-10-CM

## 2024-04-09 DIAGNOSIS — R80.9 TYPE 2 DIABETES MELLITUS WITH DIABETIC MICROALBUMINURIA, WITHOUT LONG-TERM CURRENT USE OF INSULIN: Primary | ICD-10-CM

## 2024-04-09 DIAGNOSIS — Z86.16 HISTORY OF COVID-19: Chronic | ICD-10-CM

## 2024-04-09 DIAGNOSIS — Z86.010 HISTORY OF COLON POLYPS: Chronic | ICD-10-CM

## 2024-04-09 DIAGNOSIS — Z90.11 S/P RIGHT MASTECTOMY: Chronic | ICD-10-CM

## 2024-04-09 DIAGNOSIS — E11.9 ENCOUNTER FOR DIABETIC FOOT EXAM: Chronic | ICD-10-CM

## 2024-04-09 DIAGNOSIS — E78.2 MIXED HYPERLIPIDEMIA: Chronic | ICD-10-CM

## 2024-04-09 DIAGNOSIS — Z91.09 MULTIPLE ENVIRONMENTAL ALLERGIES: Chronic | ICD-10-CM

## 2024-04-09 DIAGNOSIS — Z85.3 HISTORY OF RIGHT BREAST CANCER: Chronic | ICD-10-CM

## 2024-04-09 DIAGNOSIS — K59.09 CHRONIC CONSTIPATION: Chronic | ICD-10-CM

## 2024-04-09 DIAGNOSIS — R80.9 MICROALBUMINURIA: Chronic | ICD-10-CM

## 2024-04-09 DIAGNOSIS — I10 BENIGN ESSENTIAL HYPERTENSION: Chronic | ICD-10-CM

## 2024-04-09 DIAGNOSIS — Z78.0 POSTMENOPAUSAL STATE: Chronic | ICD-10-CM

## 2024-04-09 DIAGNOSIS — Z78.9 STATIN INTOLERANCE: Chronic | ICD-10-CM

## 2024-04-09 DIAGNOSIS — E66.3 OVERWEIGHT (BMI 25.0-29.9): Chronic | ICD-10-CM

## 2024-04-09 PROCEDURE — 3074F SYST BP LT 130 MM HG: CPT | Performed by: INTERNAL MEDICINE

## 2024-04-09 PROCEDURE — 99214 OFFICE O/P EST MOD 30 MIN: CPT | Performed by: INTERNAL MEDICINE

## 2024-04-09 PROCEDURE — 1159F MED LIST DOCD IN RCRD: CPT | Performed by: INTERNAL MEDICINE

## 2024-04-09 PROCEDURE — 3078F DIAST BP <80 MM HG: CPT | Performed by: INTERNAL MEDICINE

## 2024-04-09 PROCEDURE — 1160F RVW MEDS BY RX/DR IN RCRD: CPT | Performed by: INTERNAL MEDICINE

## 2024-04-09 PROCEDURE — 3051F HG A1C>EQUAL 7.0%<8.0%: CPT | Performed by: INTERNAL MEDICINE

## 2024-04-09 RX ORDER — EZETIMIBE 10 MG/1
TABLET ORAL
Qty: 90 TABLET | Refills: 3 | Status: SHIPPED | OUTPATIENT
Start: 2024-04-09

## 2024-04-09 NOTE — PROGRESS NOTES
04/09/2024    Patient Information  Day Goldstein                                                                                          [address] [place] [address]      1955  [unfilled]  There is no work phone number on file.    Chief Complaint:     Follow-up multiple medical problems as noted below.  No new acute complaints.    History of Present Illness:    Patient with multiple chronic medical problems as noted below in assessment and plan presents today for follow-up with lab prior in order to monitor chronic medical issues.  Her past medical history reviewed and updated were necessary including health maintenance parameters.  This reveals she will be up-to-date or else accounted for after today's visit.    Review of Systems   Constitutional: Negative.   HENT: Negative.     Eyes: Negative.    Cardiovascular: Negative.    Respiratory: Negative.     Endocrine: Negative.    Hematologic/Lymphatic: Negative.    Skin: Negative.    Musculoskeletal:  Positive for back pain.   Gastrointestinal: Negative.    Genitourinary: Negative.    Neurological: Negative.    Psychiatric/Behavioral: Negative.     Allergic/Immunologic: Negative.        Active Problems:    Patient Active Problem List   Diagnosis    Type 2 diabetes mellitus with diabetic microalbuminuria, without long-term current use of insulin    Hyperlipidemia    Benign essential hypertension    History of right breast cancer, 2004--status post mastectomy, chemotherapy, Adriamycin and Cytoxan.  Arimidex.    S/P right mastectomy    Therapeutic drug monitoring    Vitamin D deficiency    Postmenopausal state    Depression with anxiety    Chronic left-sided low back pain with left-sided sciatica    Obstructive sleep apnea    Spinal stenosis of lumbar region without neurogenic claudication    Chronic constipation    Overweight (BMI 25.0-29.9)    Rosacea    Statin intolerance, except for Lipitor.  Myalgias    Multiple environmental allergies     History of colon polyps    Microalbuminuria    History of COVID-19    Tortuous colon, patient cannot tolerate colonoscopy    Encounter for diabetic foot exam         Past Medical History:   Diagnosis Date    Benign essential hypertension 01/22/2020    Chronic constipation 04/04/2022    Chronic left-sided low back pain with left-sided sciatica 04/04/2022 October 30, 2020--MRI of the lumbar spine reveals no evidence of osseous metastatic disease.  Facet osteophytes and disc bulges resulting in moderate to severe neural foraminal stenosis at L3-L4, L4-L5, and L5-S1.  There is moderate to severe spinal stenosis at L3-L4 and L4-L5.    Chronic lower back pain 04/04/2022 October 30, 2020--MRI of the lumbar spine reveals no evidence of osseous metastatic disease.  Facet osteophytes and disc bulges resulting in moderate to severe neural foraminal stenosis at L3-L4, L4-L5, and L5-S1.  There is moderate to severe spinal stenosis at L3-L4 and L4-L5.    Depression with anxiety 04/01/2022    History of colon polyps 04/04/2022 August 22, 2018--colonoscopy was normal except for redundant colon.  This was followed up with an air-contrast barium enema which revealed a very long and tortuous colon.  No mass or polyp was noted.    History of COVID-19 11/16/2022 August 2022--patient tested positive for COVID.  Symptoms were mild and resolved without sequelae.      History of right breast cancer 10/16/2017    3/23/2004: Right mastectomy/axillary node dissection.    Hyperlipidemia 01/22/2020    Multiple environmental allergies 04/04/2022    Obstructive sleep apnea 04/04/2022    Overweight (BMI 25.0-29.9) 04/04/2022    Postmenopausal state 04/01/2022    S/P right mastectomy 08/17/2020    3/23/2004: Right mastectomy/axillary node dissection.    Spinal stenosis of lumbar region without neurogenic claudication 04/04/2022    Statin intolerance, except for Lipitor.  Myalgias 04/04/2022    Tortuous colon, patient cannot tolerate  colonoscopy 03/12/2024    Type 2 diabetes mellitus with diabetic microalbuminuria, without long-term current use of insulin 01/22/2020    Vitamin D deficiency 04/01/2022         Past Surgical History:   Procedure Laterality Date    BREAST BIOPSY Left     COLONOSCOPY  08/22/2018 August 22, 2018--colonoscopy was normal except for redundant colon.  This was followed up with an air-contrast barium enema which revealed a very long and tortuous colon.  No mass or polyp was noted.    HEMORRHOIDECTOMY  01/26/2022 January 26, 2022--excision of internal and external grade 3 hemorrhoids.    MASTECTOMY MODIFIED RADICAL / SIMPLE / COMPLETE Right 03/23/2004    3/23/2004: Right mastectomy/axillary node dissection.    REDUCTION MAMMAPLASTY Left     TONSILLECTOMY           Allergies   Allergen Reactions    Hydromorphone Hcl Itching    Statins Myalgia    Thimerosal (Thiomersal) Unknown - High Severity    Penicillins Itching and Rash           Current Outpatient Medications:     atorvastatin (LIPITOR) 10 MG tablet, Take 1 p.o. daily for high cholesterol, Disp: 90 tablet, Rfl: 3    CALCIUM & MAGNESIUM CARBONATES PO, Take 1 tablet by mouth Daily., Disp: , Rfl:     cetirizine (zyrTEC) 10 MG tablet, Take 1 tablet by mouth Daily., Disp: , Rfl:     Cholecalciferol (vitamin D3) 125 MCG (5000 UT) capsule capsule, Take 1 capsule by mouth Daily., Disp: , Rfl:     Coenzyme Q10 (CoQ10 Maximum Strength) 400 MG capsule, Take 1 p.o. daily with food as directed, Disp: , Rfl: 0    dapagliflozin Propanediol (Farxiga) 10 MG tablet, Take 1 p.o. daily before the largest meal for diabetes, Disp: , Rfl:     docusate calcium (SURFAK) 240 MG capsule, Take 1 capsule by mouth., Disp: , Rfl:     glipizide (GLUCOTROL XL) 5 MG ER tablet, TAKE 1 TABLET BY MOUTH DAILY FOR DIABETES, Disp: 30 tablet, Rfl: 0    guaiFENesin (MUCINEX) 600 MG 12 hr tablet, Take 2 tablets by mouth., Disp: , Rfl:     levothyroxine (SYNTHROID, LEVOTHROID) 125 MCG tablet, TAKE 1 TABLET  "DAILY FOR LOWTHYROID, Disp: 90 tablet, Rfl: 3    Liraglutide (Victoza) 18 MG/3ML solution pen-injector injection, Inject 0.6 mL daily as directed for diabetes, Disp: , Rfl:     loratadine (CLARITIN) 10 MG tablet, Take 1 tablet by mouth Daily., Disp: , Rfl:     metFORMIN (GLUCOPHAGE) 1000 MG tablet, TAKE 1 TABLET BY MOUTH TWICE DAILY WITH MEALS, Disp: 60 tablet, Rfl: 0    olmesartan-hydrochlorothiazide (BENICAR HCT) 40-12.5 MG per tablet, Take 1 p.o. daily for blood pressure and kidney protection, Disp: , Rfl:     Omega-3 Fatty Acids (FISH OIL) 1000 MG capsule capsule, Take 1 capsule by mouth., Disp: , Rfl:     OneTouch Verio test strip, USE TO TEST BLOOD GLUCOSE TWICE DAILY, Disp: 200 each, Rfl: 0    Polyethylene Glycol 3350 (MIRALAX PO), Take  by mouth., Disp: , Rfl:     venlafaxine XR (EFFEXOR-XR) 75 MG 24 hr capsule, TAKE 1 CAPSULE DAILY FOR   DEPRESSION, Disp: 90 capsule, Rfl: 3    ezetimibe (Zetia) 10 MG tablet, Take 1 tablet (10 mg) p.o. daily for high cholesterol, Disp: 90 tablet, Rfl: 3      Family History   Problem Relation Age of Onset    Emphysema Mother     Alcohol abuse Father     Hyperlipidemia Sister     Breast cancer Sister     Prostate cancer Brother     Hypothyroidism Brother          Social History     Socioeconomic History    Marital status:    Tobacco Use    Smoking status: Never    Smokeless tobacco: Never   Vaping Use    Vaping status: Never Used   Substance and Sexual Activity    Alcohol use: Not Currently     Comment: Social alcohol consumption,, maybe 2 drinks per month.    Drug use: Never    Sexual activity: Yes     Partners: Male         Vitals:    04/09/24 1312   BP: 110/70   Pulse: 76   Resp: 18   Temp: 97.5 °F (36.4 °C)   SpO2: 97%   Weight: 70.3 kg (155 lb)   Height: 162.6 cm (64.02\")        Body mass index is 26.59 kg/m².      Physical Exam:    General: Alert and oriented x 3.  No acute distress.  Normal affect.  Overweight.  HEENT: Pupils equal, round, reactive to light; " extraocular movements intact; sclerae nonicteric; pharynx, ear canals and TMs normal.  Neck: Without JVD, thyromegaly, bruit, or adenopathy.  Lungs: Clear to auscultation in all fields.  Heart: Regular rate and rhythm without murmur, rub, gallop, or click.  Abdomen: Soft, nontender, without hepatosplenomegaly or hernia.  Bowel sounds normal.  : Deferred.  Rectal: Deferred.  Extremities: Without clubbing, cyanosis, edema, or pulse deficit.  Neurologic: Intact without focal deficit.  Normal station and gait observed during ingress and egress from the examination room.  Skin: Without significant lesion.  Musculoskeletal: Unremarkable.    April 9, 2024--routine diabetic foot exam reveals no evidence of diabetic foot ulcer or preulcerative callus.  Distal pulses intact.  Sensation appears intact.    Lab/other results:    CBC normal.  CPK normal at 92.  CMP normal except glucose 124, calcium mildly elevated 10.8, albumin elevated 5.1.  Hemoglobin A1c elevated 7.1.  Total cholesterol 242, triglycerides 379, LDL particle number elevated 2109, HDL particle number low at 29.2.  Microalbumin/creatinine ratio was 24.  Thyroid function tests are normal.  Urinalysis normal except 3+ glucose.  Vitamin D normal at 47.1.  SARS antibodies are positive.  Cologuard returned negative.    Assessment/Plan:     Diagnosis Plan   1. Type 2 diabetes mellitus with diabetic microalbuminuria, without long-term current use of insulin  Comprehensive Metabolic Panel    Hemoglobin A1c      2. Microalbuminuria  Microalbumin / Creatinine Urine Ratio - Urine, Clean Catch      3. Hyperlipidemia  CK    Comprehensive Metabolic Panel    NMR LipoProfile    TSH    T4, Free    T3, Free    ezetimibe (Zetia) 10 MG tablet      4. Statin intolerance, except for Lipitor.  Myalgias        5. Benign essential hypertension  Comprehensive Metabolic Panel      6. History of COVID-19  SARS-CoV-2 Antibodies, Nucleocapsid (Natural Immunity)      7. Vitamin D deficiency   Vitamin D,25-Hydroxy      8. Encounter for diabetic foot exam        9. Depression with anxiety        10. History of colon polyps        11. Tortuous colon, patient cannot tolerate colonoscopy        12. History of right breast cancer, 2004--status post mastectomy, chemotherapy, Adriamycin and Cytoxan.  Arimidex.        13. S/P right mastectomy        14. Multiple environmental allergies        15. Obstructive sleep apnea        16. Overweight (BMI 25.0-29.9)        17. Postmenopausal state        18. Chronic constipation        19. Therapeutic drug monitoring  CBC (No Diff)        Patient has type 2 diabetes that is under fairly good control on the current regimen.  Microalbuminuria is mild and stable.  Patient has statin intolerance except previously she can tolerate Lipitor.  It appears she is not taking any cholesterol medication at the present time.  See below.  Hypertension appears to be controlled.  She has a history of COVID-19 infection and still has SARS antibodies.  Vitamin D in the normal range.  Depression with anxiety is stable.  She has a history of colon polyps but cannot have a colonoscopy due to a tortuous colon.  Recent Cologuard negative.  She also has a history of remote right breast cancer and had a mastectomy, chemotherapy and there is no evidence of recurrence.  Her environmental allergies are stable.  Her sleep apnea is also stable.  She is postmenopausal and currently is up-to-date on her DEXA scan.  She has normal bone density.  Her calcium is elevated but her albumin is also elevated and the ionized calcium would be normal.  No need to workup at this time.    Plan is as follows: Add ezetimibe 10 mg/day.  Continue with generic Lipitor 10 mg/day.  Will schedule a fasting lab and follow-up in about 6 months.    This patient has a PCP that is the continuing focal point for all health care services, and the patient sees this physician to be evaluated for multiple medical problems. The  "inherent complexity that this code () captures is not in the clinical condition itself, but rather the cognitition of the continued responsibility of being the focal point for all needed services for this patient.\"       Procedures        "

## 2024-04-11 ENCOUNTER — TELEPHONE (OUTPATIENT)
Dept: INTERNAL MEDICINE | Facility: CLINIC | Age: 69
End: 2024-04-11

## 2024-04-16 ENCOUNTER — TELEPHONE (OUTPATIENT)
Dept: INTERNAL MEDICINE | Facility: CLINIC | Age: 69
End: 2024-04-16
Payer: MEDICARE

## 2024-04-16 DIAGNOSIS — F41.8 DEPRESSION WITH ANXIETY: Chronic | ICD-10-CM

## 2024-04-16 DIAGNOSIS — E78.2 MIXED HYPERLIPIDEMIA: Chronic | ICD-10-CM

## 2024-04-16 DIAGNOSIS — E11.9 TYPE 2 DIABETES MELLITUS WITHOUT COMPLICATION, WITHOUT LONG-TERM CURRENT USE OF INSULIN: Chronic | ICD-10-CM

## 2024-04-16 NOTE — TELEPHONE ENCOUNTER
Caller: Day Goldstein    Relationship: Self    Best call back number:    [phone] (Mobile)       Requested Prescriptions:     venlafaxine XR (EFFEXOR-XR) 75 MG 24 hr capsule     metFORMIN (GLUCOPHAGE) 1000 MG tablet       glipizide (GLUCOTROL XL) 5 MG ER tablet      atorvastatin (LIPITOR) 10 MG tablet     ezetimibe (Zetia) 10 MG tablet     levothyroxine (SYNTHROID, LEVOTHROID) 125 MCG tablet     Pharmacy where request should be sent: [facility] - [place], [place] - [facility] - [phone] [facility] [phone] FX      Last office visit with prescribing clinician: 4/9/2024   Last telemedicine visit with prescribing clinician: Visit date not found   Next office visit with prescribing clinician: 10/1/2024     Additional details provided by patient: PATIENT CALLED TO REQUEST A MEDICATION REFILL ON MEDICATIONS. PATIENT HAS A 7 DAY SUPPLY LEFT.      Does the patient have less than a 3 day supply:  [] Yes  [x] No    Would you like a call back once the refill request has been completed: [] Yes [] No    If the office needs to give you a call back, can they leave a voicemail: [] Yes [] No    Myriam Murrieta Rep   04/16/24 15:24 EDT         THANKS

## 2024-04-17 RX ORDER — LEVOTHYROXINE SODIUM 0.12 MG/1
TABLET ORAL
Qty: 90 TABLET | Refills: 3 | Status: SHIPPED | OUTPATIENT
Start: 2024-04-17 | End: 2024-04-17 | Stop reason: SDUPTHER

## 2024-04-17 RX ORDER — VENLAFAXINE HYDROCHLORIDE 75 MG/1
CAPSULE, EXTENDED RELEASE ORAL
Qty: 90 CAPSULE | Refills: 3 | Status: SHIPPED | OUTPATIENT
Start: 2024-04-17

## 2024-04-17 RX ORDER — EZETIMIBE 10 MG/1
TABLET ORAL
Qty: 90 TABLET | Refills: 3 | Status: SHIPPED | OUTPATIENT
Start: 2024-04-17

## 2024-04-17 RX ORDER — LOSARTAN POTASSIUM AND HYDROCHLOROTHIAZIDE 25; 100 MG/1; MG/1
TABLET ORAL
Qty: 90 TABLET | Refills: 3 | Status: SHIPPED | OUTPATIENT
Start: 2024-04-17

## 2024-04-17 RX ORDER — GLIPIZIDE 5 MG/1
TABLET, FILM COATED, EXTENDED RELEASE ORAL
Qty: 90 TABLET | OUTPATIENT
Start: 2024-04-17

## 2024-04-17 RX ORDER — LEVOTHYROXINE SODIUM 0.12 MG/1
TABLET ORAL
Qty: 90 TABLET | Refills: 3 | Status: SHIPPED | OUTPATIENT
Start: 2024-04-17

## 2024-04-17 RX ORDER — ATORVASTATIN CALCIUM 10 MG/1
TABLET, FILM COATED ORAL
Qty: 90 TABLET | Refills: 3 | Status: SHIPPED | OUTPATIENT
Start: 2024-04-17

## 2024-04-17 RX ORDER — GLIPIZIDE 5 MG/1
TABLET, FILM COATED, EXTENDED RELEASE ORAL
Qty: 30 TABLET | Refills: 0 | Status: SHIPPED | OUTPATIENT
Start: 2024-04-17

## 2024-04-17 RX ORDER — VENLAFAXINE HYDROCHLORIDE 75 MG/1
CAPSULE, EXTENDED RELEASE ORAL
Qty: 90 CAPSULE | Refills: 3 | Status: SHIPPED | OUTPATIENT
Start: 2024-04-17 | End: 2024-04-17 | Stop reason: SDUPTHER

## 2024-04-17 RX ORDER — MELOXICAM 15 MG/1
15 TABLET ORAL DAILY
Qty: 90 TABLET | Refills: 3 | Status: SHIPPED | OUTPATIENT
Start: 2024-04-17

## 2024-04-25 DIAGNOSIS — E11.9 TYPE 2 DIABETES MELLITUS WITHOUT COMPLICATION, WITHOUT LONG-TERM CURRENT USE OF INSULIN: Chronic | ICD-10-CM

## 2024-05-02 DIAGNOSIS — F41.8 DEPRESSION WITH ANXIETY: Chronic | ICD-10-CM

## 2024-05-02 DIAGNOSIS — E11.9 TYPE 2 DIABETES MELLITUS WITHOUT COMPLICATION, WITHOUT LONG-TERM CURRENT USE OF INSULIN: Chronic | ICD-10-CM

## 2024-05-02 DIAGNOSIS — E78.2 MIXED HYPERLIPIDEMIA: Chronic | ICD-10-CM

## 2024-05-02 RX ORDER — EZETIMIBE 10 MG/1
TABLET ORAL
Qty: 90 TABLET | Refills: 3 | Status: SHIPPED | OUTPATIENT
Start: 2024-05-02

## 2024-05-02 RX ORDER — LEVOTHYROXINE SODIUM 0.12 MG/1
TABLET ORAL
Qty: 90 TABLET | Refills: 3 | Status: SHIPPED | OUTPATIENT
Start: 2024-05-02

## 2024-05-02 RX ORDER — ATORVASTATIN CALCIUM 10 MG/1
TABLET, FILM COATED ORAL
Qty: 90 TABLET | Refills: 3 | Status: SHIPPED | OUTPATIENT
Start: 2024-05-02 | End: 2024-05-06 | Stop reason: SDUPTHER

## 2024-05-02 RX ORDER — LIRAGLUTIDE 6 MG/ML
INJECTION SUBCUTANEOUS
Start: 2024-05-02

## 2024-05-02 RX ORDER — LOSARTAN POTASSIUM AND HYDROCHLOROTHIAZIDE 25; 100 MG/1; MG/1
1 TABLET ORAL DAILY
Qty: 90 TABLET | Refills: 3 | Status: SHIPPED | OUTPATIENT
Start: 2024-05-02

## 2024-05-02 RX ORDER — VENLAFAXINE HYDROCHLORIDE 75 MG/1
CAPSULE, EXTENDED RELEASE ORAL
Qty: 90 CAPSULE | Refills: 3 | Status: SHIPPED | OUTPATIENT
Start: 2024-05-02

## 2024-05-06 ENCOUNTER — PATIENT MESSAGE (OUTPATIENT)
Dept: INTERNAL MEDICINE | Facility: CLINIC | Age: 69
End: 2024-05-06
Payer: MEDICARE

## 2024-05-06 DIAGNOSIS — E78.2 MIXED HYPERLIPIDEMIA: Chronic | ICD-10-CM

## 2024-05-06 DIAGNOSIS — E11.9 TYPE 2 DIABETES MELLITUS WITHOUT COMPLICATION, WITHOUT LONG-TERM CURRENT USE OF INSULIN: Chronic | ICD-10-CM

## 2024-05-06 RX ORDER — GLIPIZIDE 5 MG/1
TABLET, FILM COATED, EXTENDED RELEASE ORAL
Qty: 90 TABLET | Refills: 3 | Status: SHIPPED | OUTPATIENT
Start: 2024-05-06

## 2024-05-06 RX ORDER — ATORVASTATIN CALCIUM 10 MG/1
TABLET, FILM COATED ORAL
Qty: 90 TABLET | Refills: 3 | Status: SHIPPED | OUTPATIENT
Start: 2024-05-06

## 2024-05-06 RX ORDER — GLIPIZIDE 5 MG/1
TABLET, FILM COATED, EXTENDED RELEASE ORAL
Qty: 90 TABLET | OUTPATIENT
Start: 2024-05-06

## 2024-05-14 DIAGNOSIS — E11.9 TYPE 2 DIABETES MELLITUS WITHOUT COMPLICATION, WITHOUT LONG-TERM CURRENT USE OF INSULIN: Chronic | ICD-10-CM

## 2024-05-14 DIAGNOSIS — E78.2 MIXED HYPERLIPIDEMIA: Chronic | ICD-10-CM

## 2024-05-14 RX ORDER — EZETIMIBE 10 MG/1
TABLET ORAL
Qty: 90 TABLET | Refills: 3 | Status: SHIPPED | OUTPATIENT
Start: 2024-05-14

## 2024-05-14 RX ORDER — GLIPIZIDE 5 MG/1
TABLET, FILM COATED, EXTENDED RELEASE ORAL
Qty: 90 TABLET | Refills: 3 | Status: SHIPPED | OUTPATIENT
Start: 2024-05-14

## 2024-05-14 RX ORDER — ATORVASTATIN CALCIUM 10 MG/1
TABLET, FILM COATED ORAL
Qty: 90 TABLET | Refills: 3 | Status: SHIPPED | OUTPATIENT
Start: 2024-05-14

## 2024-05-20 DIAGNOSIS — E11.9 TYPE 2 DIABETES MELLITUS WITHOUT COMPLICATION, WITHOUT LONG-TERM CURRENT USE OF INSULIN: Chronic | ICD-10-CM

## 2024-05-20 NOTE — TELEPHONE ENCOUNTER
Spoke with Pt and she confirmed that all her medications needs to be sent to [facility] pharmacy and NOT [facility].

## 2024-09-05 RX ORDER — BLOOD SUGAR DIAGNOSTIC
STRIP MISCELLANEOUS
Qty: 200 EACH | Refills: 0 | Status: SHIPPED | OUTPATIENT
Start: 2024-09-05

## 2024-11-22 ENCOUNTER — LAB (OUTPATIENT)
Dept: LAB | Facility: HOSPITAL | Age: 69
End: 2024-11-22
Payer: MEDICARE

## 2024-11-22 PROCEDURE — 83704 LIPOPROTEIN BLD QUAN PART: CPT | Performed by: INTERNAL MEDICINE

## 2024-11-22 PROCEDURE — 82570 ASSAY OF URINE CREATININE: CPT | Performed by: INTERNAL MEDICINE

## 2024-11-22 PROCEDURE — 83036 HEMOGLOBIN GLYCOSYLATED A1C: CPT | Performed by: INTERNAL MEDICINE

## 2024-11-22 PROCEDURE — 84481 FREE ASSAY (FT-3): CPT | Performed by: INTERNAL MEDICINE

## 2024-11-22 PROCEDURE — 86769 SARS-COV-2 COVID-19 ANTIBODY: CPT | Performed by: INTERNAL MEDICINE

## 2024-11-22 PROCEDURE — 82550 ASSAY OF CK (CPK): CPT | Performed by: INTERNAL MEDICINE

## 2024-11-22 PROCEDURE — 82043 UR ALBUMIN QUANTITATIVE: CPT | Performed by: INTERNAL MEDICINE

## 2024-11-22 PROCEDURE — 84443 ASSAY THYROID STIM HORMONE: CPT | Performed by: INTERNAL MEDICINE

## 2024-11-22 PROCEDURE — 80061 LIPID PANEL: CPT | Performed by: INTERNAL MEDICINE

## 2024-11-22 PROCEDURE — 82306 VITAMIN D 25 HYDROXY: CPT | Performed by: INTERNAL MEDICINE

## 2024-11-22 PROCEDURE — 80053 COMPREHEN METABOLIC PANEL: CPT | Performed by: INTERNAL MEDICINE

## 2024-11-22 PROCEDURE — 85027 COMPLETE CBC AUTOMATED: CPT | Performed by: INTERNAL MEDICINE

## 2024-11-22 PROCEDURE — 84439 ASSAY OF FREE THYROXINE: CPT | Performed by: INTERNAL MEDICINE

## 2024-11-25 ENCOUNTER — OFFICE VISIT (OUTPATIENT)
Dept: INTERNAL MEDICINE | Facility: CLINIC | Age: 69
End: 2024-11-25
Payer: MEDICARE

## 2024-11-25 VITALS
TEMPERATURE: 98.4 F | SYSTOLIC BLOOD PRESSURE: 126 MMHG | DIASTOLIC BLOOD PRESSURE: 74 MMHG | BODY MASS INDEX: 25.51 KG/M2 | HEART RATE: 72 BPM | HEIGHT: 64 IN | RESPIRATION RATE: 16 BRPM | WEIGHT: 149.4 LBS | OXYGEN SATURATION: 97 %

## 2024-11-25 DIAGNOSIS — Z78.9 STATIN INTOLERANCE: Chronic | ICD-10-CM

## 2024-11-25 DIAGNOSIS — G47.33 OBSTRUCTIVE SLEEP APNEA: Chronic | ICD-10-CM

## 2024-11-25 DIAGNOSIS — E78.2 MIXED HYPERLIPIDEMIA: Chronic | ICD-10-CM

## 2024-11-25 DIAGNOSIS — Z90.11 S/P RIGHT MASTECTOMY: Chronic | ICD-10-CM

## 2024-11-25 DIAGNOSIS — I10 BENIGN ESSENTIAL HYPERTENSION: Chronic | ICD-10-CM

## 2024-11-25 DIAGNOSIS — Z51.81 THERAPEUTIC DRUG MONITORING: ICD-10-CM

## 2024-11-25 DIAGNOSIS — M48.061 SPINAL STENOSIS OF LUMBAR REGION WITHOUT NEUROGENIC CLAUDICATION: Chronic | ICD-10-CM

## 2024-11-25 DIAGNOSIS — E11.29 TYPE 2 DIABETES MELLITUS WITH DIABETIC MICROALBUMINURIA, WITHOUT LONG-TERM CURRENT USE OF INSULIN: Primary | Chronic | ICD-10-CM

## 2024-11-25 DIAGNOSIS — Z85.3 HISTORY OF RIGHT BREAST CANCER: Chronic | ICD-10-CM

## 2024-11-25 DIAGNOSIS — F41.8 DEPRESSION WITH ANXIETY: Chronic | ICD-10-CM

## 2024-11-25 DIAGNOSIS — R80.9 TYPE 2 DIABETES MELLITUS WITH DIABETIC MICROALBUMINURIA, WITHOUT LONG-TERM CURRENT USE OF INSULIN: Primary | Chronic | ICD-10-CM

## 2024-11-25 DIAGNOSIS — Q43.8 TORTUOUS COLON: Chronic | ICD-10-CM

## 2024-11-25 DIAGNOSIS — R80.9 MICROALBUMINURIA: Chronic | ICD-10-CM

## 2024-11-25 DIAGNOSIS — Z78.0 POSTMENOPAUSAL STATE: Chronic | ICD-10-CM

## 2024-11-25 DIAGNOSIS — E55.9 VITAMIN D DEFICIENCY: Chronic | ICD-10-CM

## 2024-11-25 DIAGNOSIS — Z86.0100 HISTORY OF COLON POLYPS: Chronic | ICD-10-CM

## 2024-11-25 DIAGNOSIS — E66.3 OVERWEIGHT (BMI 25.0-29.9): Chronic | ICD-10-CM

## 2024-11-25 DIAGNOSIS — Z86.16 HISTORY OF COVID-19: Chronic | ICD-10-CM

## 2024-11-25 DIAGNOSIS — K59.09 CHRONIC CONSTIPATION: Chronic | ICD-10-CM

## 2024-11-25 PROCEDURE — 99214 OFFICE O/P EST MOD 30 MIN: CPT | Performed by: INTERNAL MEDICINE

## 2024-11-25 PROCEDURE — 3051F HG A1C>EQUAL 7.0%<8.0%: CPT | Performed by: INTERNAL MEDICINE

## 2024-11-25 PROCEDURE — 3078F DIAST BP <80 MM HG: CPT | Performed by: INTERNAL MEDICINE

## 2024-11-25 PROCEDURE — 3074F SYST BP LT 130 MM HG: CPT | Performed by: INTERNAL MEDICINE

## 2024-11-25 PROCEDURE — 1125F AMNT PAIN NOTED PAIN PRSNT: CPT | Performed by: INTERNAL MEDICINE

## 2024-11-25 RX ORDER — SEMAGLUTIDE 1.34 MG/ML
INJECTION, SOLUTION SUBCUTANEOUS
Qty: 3 ML | Refills: 6 | Status: SHIPPED | OUTPATIENT
Start: 2024-11-25

## 2024-11-25 NOTE — PROGRESS NOTES
11/25/2024    Patient Information  Day Goldstein                                                                                          [address] [place] [address]      1955  [unfilled]  There is no work phone number on file.    Chief Complaint:     Follow-up chronic medical problems and recent lab work.  No new acute complaints.    History of Present Illness:    Patient with chronic medical problems including type 2 diabetes as noted below in assessment and plan presents today for a follow-up with lab prior in order to monitor those chronic medical issues.  Her past medical history reviewed and updated were necessary including health maintenance parameters.  This reveals she needs DEXA scan and possibly influenza vaccine.  DEXA scan ordered today.    Review of Systems   Constitutional: Negative.   HENT: Negative.     Eyes: Negative.    Cardiovascular: Negative.    Respiratory: Negative.     Endocrine: Negative.    Hematologic/Lymphatic: Negative.    Skin: Negative.    Musculoskeletal: Negative.    Gastrointestinal: Negative.    Genitourinary: Negative.    Neurological: Negative.    Psychiatric/Behavioral: Negative.     Allergic/Immunologic: Negative.        Active Problems:    Patient Active Problem List   Diagnosis    Type 2 diabetes mellitus with diabetic microalbuminuria, without long-term current use of insulin    Hyperlipidemia    Benign essential hypertension    History of right breast cancer, 2004--status post mastectomy, chemotherapy, Adriamycin and Cytoxan.  Arimidex.    S/P right mastectomy    Therapeutic drug monitoring    Vitamin D deficiency    Postmenopausal state    Depression with anxiety    Chronic left-sided low back pain with left-sided sciatica    Obstructive sleep apnea    Spinal stenosis of lumbar region without neurogenic claudication    Chronic constipation    Overweight (BMI 25.0-29.9)    Rosacea    Statin intolerance, except for Lipitor.  Myalgias     Multiple environmental allergies    History of colon polyps    Microalbuminuria    History of COVID-19    Tortuous colon, patient cannot tolerate colonoscopy    Encounter for diabetic foot exam         Past Medical History:   Diagnosis Date    Benign essential hypertension 01/22/2020    Chronic constipation 04/04/2022    Chronic left-sided low back pain with left-sided sciatica 04/04/2022 October 30, 2020--MRI of the lumbar spine reveals no evidence of osseous metastatic disease.  Facet osteophytes and disc bulges resulting in moderate to severe neural foraminal stenosis at L3-L4, L4-L5, and L5-S1.  There is moderate to severe spinal stenosis at L3-L4 and L4-L5.    Chronic lower back pain 04/04/2022 October 30, 2020--MRI of the lumbar spine reveals no evidence of osseous metastatic disease.  Facet osteophytes and disc bulges resulting in moderate to severe neural foraminal stenosis at L3-L4, L4-L5, and L5-S1.  There is moderate to severe spinal stenosis at L3-L4 and L4-L5.    Depression with anxiety 04/01/2022    History of colon polyps 04/04/2022 August 22, 2018--colonoscopy was normal except for redundant colon.  This was followed up with an air-contrast barium enema which revealed a very long and tortuous colon.  No mass or polyp was noted.    History of COVID-19 11/16/2022 August 2022--patient tested positive for COVID.  Symptoms were mild and resolved without sequelae.      History of right breast cancer 10/16/2017    3/23/2004: Right mastectomy/axillary node dissection.    Hyperlipidemia 01/22/2020    Multiple environmental allergies 04/04/2022    Obstructive sleep apnea 04/04/2022    Overweight (BMI 25.0-29.9) 04/04/2022    Postmenopausal state 04/01/2022    S/P right mastectomy 08/17/2020    3/23/2004: Right mastectomy/axillary node dissection.    Spinal stenosis of lumbar region without neurogenic claudication 04/04/2022    Statin intolerance, except for Lipitor.  Myalgias 04/04/2022    Tortuous  colon, patient cannot tolerate colonoscopy 03/12/2024    Type 2 diabetes mellitus with diabetic microalbuminuria, without long-term current use of insulin 01/22/2020    Vitamin D deficiency 04/01/2022         Past Surgical History:   Procedure Laterality Date    BREAST BIOPSY Left     COLONOSCOPY  08/22/2018 August 22, 2018--colonoscopy was normal except for redundant colon.  This was followed up with an air-contrast barium enema which revealed a very long and tortuous colon.  No mass or polyp was noted.    HEMORRHOIDECTOMY  01/26/2022 January 26, 2022--excision of internal and external grade 3 hemorrhoids.    MASTECTOMY MODIFIED RADICAL / SIMPLE / COMPLETE Right 03/23/2004    3/23/2004: Right mastectomy/axillary node dissection.    REDUCTION MAMMAPLASTY Left     TONSILLECTOMY           Allergies   Allergen Reactions    Hydromorphone Hcl Itching    Statins Myalgia    Thimerosal (Thiomersal) Unknown - High Severity    Penicillins Itching and Rash           Current Outpatient Medications:     atorvastatin (LIPITOR) 10 MG tablet, Take 1 p.o. daily for high cholesterol, Disp: 90 tablet, Rfl: 3    CALCIUM & MAGNESIUM CARBONATES PO, Take 1 tablet by mouth Daily., Disp: , Rfl:     cetirizine (zyrTEC) 10 MG tablet, Take 1 tablet by mouth Daily., Disp: , Rfl:     Cholecalciferol (vitamin D3) 125 MCG (5000 UT) capsule capsule, Take 1 capsule by mouth Daily., Disp: , Rfl:     Coenzyme Q10 (CoQ10 Maximum Strength) 400 MG capsule, Take 1 p.o. daily with food as directed, Disp: , Rfl: 0    dapagliflozin Propanediol (Farxiga) 10 MG tablet, Take 1 p.o. daily before the largest meal for diabetes, Disp: , Rfl:     docusate calcium (SURFAK) 240 MG capsule, Take 1 capsule by mouth., Disp: , Rfl:     ezetimibe (Zetia) 10 MG tablet, Take 1 tablet (10 mg) p.o. daily for high cholesterol, Disp: 90 tablet, Rfl: 3    glipizide (GLUCOTROL XL) 5 MG ER tablet, TAKE 1 TABLET BY MOUTH DAILY FOR DIABETES, Disp: 90 tablet, Rfl: 3     "guaiFENesin (MUCINEX) 600 MG 12 hr tablet, Take 2 tablets by mouth., Disp: , Rfl:     levothyroxine (SYNTHROID, LEVOTHROID) 125 MCG tablet, TAKE 1 TABLET DAILY FOR LOWTHYROID, Disp: 90 tablet, Rfl: 3    loratadine (CLARITIN) 10 MG tablet, Take 1 tablet by mouth Daily., Disp: , Rfl:     metFORMIN (GLUCOPHAGE) 1000 MG tablet, Take 1 tablet by mouth 2 (Two) Times a Day With Meals., Disp: 180 tablet, Rfl: 3    olmesartan-hydrochlorothiazide (BENICAR HCT) 40-12.5 MG per tablet, Take 1 p.o. daily for blood pressure and kidney protection, Disp: , Rfl:     Omega-3 Fatty Acids (FISH OIL) 1000 MG capsule capsule, Take 1 capsule by mouth., Disp: , Rfl:     OneTouch Verio test strip, USE TO TEST BLOOD GLUCOSE TWICE DAILY, Disp: 200 each, Rfl: 0    Polyethylene Glycol 3350 (MIRALAX PO), Take  by mouth., Disp: , Rfl:     venlafaxine XR (EFFEXOR-XR) 75 MG 24 hr capsule, TAKE 1 CAPSULE DAILY FOR   DEPRESSION, Disp: 90 capsule, Rfl: 3    Semaglutide, 1 MG/DOSE, (Ozempic, 1 MG/DOSE,) 4 MG/3ML solution pen-injector, Inject 1 mg subcutaneously weekly as directed for diabetes, Disp: 3 mL, Rfl: 6      Family History   Problem Relation Age of Onset    Emphysema Mother     Alcohol abuse Father     Hyperlipidemia Sister     Breast cancer Sister     Prostate cancer Brother     Hypothyroidism Brother          Social History     Socioeconomic History    Marital status:    Tobacco Use    Smoking status: Never    Smokeless tobacco: Never   Vaping Use    Vaping status: Never Used   Substance and Sexual Activity    Alcohol use: Not Currently     Comment: Social alcohol consumption,, maybe 2 drinks per month.    Drug use: Never    Sexual activity: Yes     Partners: Male         Vitals:    11/25/24 1009   BP: 126/74   Pulse: 72   Resp: 16   Temp: 98.4 °F (36.9 °C)   TempSrc: Oral   SpO2: 97%   Weight: 67.8 kg (149 lb 6.4 oz)   Height: 162 cm (63.78\")        Body mass index is 25.82 kg/m².      Physical Exam:    General: Alert and oriented x " 3.  No acute distress.  Normal affect.  HEENT: Pupils equal, round, reactive to light; extraocular movements intact; sclerae nonicteric; pharynx, ear canals and TMs normal.  Neck: Without JVD, thyromegaly, bruit, or adenopathy.  Lungs: Clear to auscultation in all fields.  Heart: Regular rate and rhythm without murmur, rub, gallop, or click.  Abdomen: Soft, nontender, without hepatosplenomegaly or hernia.  Bowel sounds normal.  : Deferred.  Rectal: Deferred.  Extremities: Without clubbing, cyanosis, edema, or pulse deficit.  Neurologic: Intact without focal deficit.  Normal station and gait observed during ingress and egress from the examination room.  Skin: Without significant lesion.  Musculoskeletal: Unremarkable.    Lab/other results:    CBC normal.  CK normal at 64.  CMP normal except glucose 128.  Hemoglobin A1c 7.64.  Microalbumin/creatinine ratio not obtainable due to lack of microalbumin.  Thyroid function tests are normal.  Vitamin D normal.  SARS antibodies and NMR not back yet.    Assessment/Plan:     Diagnosis Plan   1. Type 2 diabetes mellitus with diabetic microalbuminuria, without long-term current use of insulin  Semaglutide, 1 MG/DOSE, (Ozempic, 1 MG/DOSE,) 4 MG/3ML solution pen-injector      2. Hyperlipidemia        3. Benign essential hypertension        4. Vitamin D deficiency        5. Statin intolerance, except for Lipitor.  Myalgias        6. Microalbuminuria        7. History of COVID-19        8. History of right breast cancer, 2004--status post mastectomy, chemotherapy, Adriamycin and Cytoxan.  Arimidex.        9. S/P right mastectomy        10. Postmenopausal state  DEXA Bone Density Axial      11. Depression with anxiety        12. Obstructive sleep apnea        13. Spinal stenosis of lumbar region without neurogenic claudication        14. Chronic constipation        15. Overweight (BMI 25.0-29.9)        16. History of colon polyps        17. Tortuous colon, patient cannot tolerate  "colonoscopy        18. Therapeutic drug monitoring          Patient presents in follow-up for multiple medical problems as noted.  I think her diabetes could be under better control and therefore since she is tolerating the Ozempic will increase this which should help with weight loss as well.  We cannot totally assess the lipid due to the fact the NMR profile is not back.  I will contact patient when results are available.    Plan is as follows: Increase Ozempic to 1 mg subcutaneously weekly.  I will contact patient with results of the lipid profile and the SARS antibodies when they are available.  Patient will continue to work on her diet.  She seems to be doing pretty good job and the increased Ozempic should help as well.  Will set her up for 6 months for her Medicare wellness visit.    This patient has a PCP that is the continuing focal point for all health care services, and the patient sees this physician to be evaluated for chronic medical problems. The inherent complexity that this code () captures is not in the clinical condition itself, but rather the cognitition of the continued responsibility of being the focal point for all needed services for this patient.\"     Procedures        "

## 2025-02-18 ENCOUNTER — TELEPHONE (OUTPATIENT)
Dept: INTERNAL MEDICINE | Facility: CLINIC | Age: 70
End: 2025-02-18

## 2025-02-18 NOTE — TELEPHONE ENCOUNTER
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 PLEASE CALL TO ADVISE IF NOT POSSIBLE   [id number] - [facility] [place] - [address]  - [phone]  - [phone] FX

## 2025-02-19 DIAGNOSIS — E11.29 TYPE 2 DIABETES MELLITUS WITH DIABETIC MICROALBUMINURIA, WITHOUT LONG-TERM CURRENT USE OF INSULIN: Primary | Chronic | ICD-10-CM

## 2025-02-19 DIAGNOSIS — R80.9 TYPE 2 DIABETES MELLITUS WITH DIABETIC MICROALBUMINURIA, WITHOUT LONG-TERM CURRENT USE OF INSULIN: Primary | Chronic | ICD-10-CM

## 2025-03-03 ENCOUNTER — HOSPITAL ENCOUNTER (OUTPATIENT)
Dept: BONE DENSITY | Facility: HOSPITAL | Age: 70
Discharge: HOME | End: 2025-03-03
Admitting: INTERNAL MEDICINE
Payer: MEDICARE

## 2025-03-03 PROCEDURE — 77080 DXA BONE DENSITY AXIAL: CPT

## 2025-05-19 ENCOUNTER — OFFICE VISIT (OUTPATIENT)
Dept: INTERNAL MEDICINE | Facility: CLINIC | Age: 70
End: 2025-05-19
Payer: MEDICARE

## 2025-05-19 VITALS
OXYGEN SATURATION: 99 % | DIASTOLIC BLOOD PRESSURE: 70 MMHG | HEIGHT: 64 IN | RESPIRATION RATE: 16 BRPM | TEMPERATURE: 97.8 F | BODY MASS INDEX: 24.92 KG/M2 | SYSTOLIC BLOOD PRESSURE: 136 MMHG | WEIGHT: 146 LBS | HEART RATE: 71 BPM

## 2025-05-19 DIAGNOSIS — R80.9 MICROALBUMINURIA: Chronic | ICD-10-CM

## 2025-05-19 DIAGNOSIS — Z12.31 BREAST CANCER SCREENING BY MAMMOGRAM: ICD-10-CM

## 2025-05-19 DIAGNOSIS — E55.9 VITAMIN D DEFICIENCY: Chronic | ICD-10-CM

## 2025-05-19 DIAGNOSIS — E78.2 MIXED HYPERLIPIDEMIA: Chronic | ICD-10-CM

## 2025-05-19 DIAGNOSIS — I10 BENIGN ESSENTIAL HYPERTENSION: Chronic | ICD-10-CM

## 2025-05-19 DIAGNOSIS — Z51.81 THERAPEUTIC DRUG MONITORING: ICD-10-CM

## 2025-05-19 DIAGNOSIS — Z86.16 HISTORY OF COVID-19: Chronic | ICD-10-CM

## 2025-05-19 DIAGNOSIS — R80.9 TYPE 2 DIABETES MELLITUS WITH DIABETIC MICROALBUMINURIA, WITHOUT LONG-TERM CURRENT USE OF INSULIN: Chronic | ICD-10-CM

## 2025-05-19 DIAGNOSIS — E11.29 TYPE 2 DIABETES MELLITUS WITH DIABETIC MICROALBUMINURIA, WITHOUT LONG-TERM CURRENT USE OF INSULIN: Chronic | ICD-10-CM

## 2025-05-19 DIAGNOSIS — Z00.00 ENCOUNTER FOR SUBSEQUENT ANNUAL WELLNESS VISIT (AWV) IN MEDICARE PATIENT: Primary | ICD-10-CM

## 2025-05-19 PROCEDURE — 1125F AMNT PAIN NOTED PAIN PRSNT: CPT | Performed by: INTERNAL MEDICINE

## 2025-05-19 PROCEDURE — G0439 PPPS, SUBSEQ VISIT: HCPCS | Performed by: INTERNAL MEDICINE

## 2025-05-19 PROCEDURE — 3078F DIAST BP <80 MM HG: CPT | Performed by: INTERNAL MEDICINE

## 2025-05-19 PROCEDURE — 1170F FXNL STATUS ASSESSED: CPT | Performed by: INTERNAL MEDICINE

## 2025-05-19 PROCEDURE — 1160F RVW MEDS BY RX/DR IN RCRD: CPT | Performed by: INTERNAL MEDICINE

## 2025-05-19 PROCEDURE — 1159F MED LIST DOCD IN RCRD: CPT | Performed by: INTERNAL MEDICINE

## 2025-05-19 PROCEDURE — 3075F SYST BP GE 130 - 139MM HG: CPT | Performed by: INTERNAL MEDICINE

## 2025-05-19 NOTE — PROGRESS NOTES
Subjective   The ABCs of the Annual Wellness Visit  Medicare Wellness Visit      Day Goldstein is a 69 y.o. patient who presents for a Medicare Wellness Visit.    The following portions of the patient's history were reviewed and   updated as appropriate: allergies, current medications, past family history, past medical history, past social history, past surgical history, and problem list.    Compared to one year ago, the patient's physical   health is the same.  Compared to one year ago, the patient's mental   health is the same.    Recent Hospitalizations:  She was not admitted to the hospital during the last year.     Current Medical Providers:  Patient Care Team:  Bennett Barcenas MD as PCP - General (Internal Medicine)    Outpatient Medications Prior to Visit   Medication Sig Dispense Refill    atorvastatin (LIPITOR) 10 MG tablet Take 1 p.o. daily for high cholesterol 90 tablet 3    CALCIUM & MAGNESIUM CARBONATES PO Take 1 tablet by mouth Daily.      cetirizine (zyrTEC) 10 MG tablet Take 1 tablet by mouth Daily.      Cholecalciferol (vitamin D3) 125 MCG (5000 UT) capsule capsule Take 1 capsule by mouth Daily.      Coenzyme Q10 (CoQ10 Maximum Strength) 400 MG capsule Take 1 p.o. daily with food as directed  0    dapagliflozin Propanediol (Farxiga) 10 MG tablet Take 1 p.o. daily before the largest meal for diabetes      docusate calcium (SURFAK) 240 MG capsule Take 1 capsule by mouth.      ezetimibe (Zetia) 10 MG tablet Take 1 tablet (10 mg) p.o. daily for high cholesterol 90 tablet 3    glipizide (GLUCOTROL XL) 5 MG ER tablet TAKE 1 TABLET BY MOUTH DAILY FOR DIABETES 90 tablet 3    guaiFENesin (MUCINEX) 600 MG 12 hr tablet Take 2 tablets by mouth.      levothyroxine (SYNTHROID, LEVOTHROID) 125 MCG tablet TAKE 1 TABLET DAILY FOR LOWTHYROID 90 tablet 3    loratadine (CLARITIN) 10 MG tablet Take 1 tablet by mouth Daily.      metFORMIN (GLUCOPHAGE) 1000 MG tablet Take 1 tablet by mouth 2 (Two) Times a Day  With Meals. 180 tablet 3    olmesartan-hydrochlorothiazide (BENICAR HCT) 40-12.5 MG per tablet Take 1 p.o. daily for blood pressure and kidney protection      Omega-3 Fatty Acids (FISH OIL) 1000 MG capsule capsule Take 1 capsule by mouth.      OneTouch Verio test strip USE TO TEST BLOOD GLUCOSE TWICE DAILY 200 each 0    Polyethylene Glycol 3350 (MIRALAX PO) Take  by mouth.      Semaglutide, 2 MG/DOSE, (OZEMPIC) 8 MG/3ML solution pen-injector Inject 2 mg subcutaneously weekly as directed 3 mL 11    venlafaxine XR (EFFEXOR-XR) 75 MG 24 hr capsule TAKE 1 CAPSULE DAILY FOR   DEPRESSION 90 capsule 3     No facility-administered medications prior to visit.     No opioid medication identified on active medication list. I have reviewed chart for other potential  high risk medication/s and harmful drug interactions in the elderly.      Aspirin is not on active medication list.  Aspirin use is not indicated based on review of current medical condition/s. Risk of harm outweighs potential benefits.  .    Patient Active Problem List   Diagnosis    Type 2 diabetes mellitus with diabetic microalbuminuria, without long-term current use of insulin    Hyperlipidemia    Benign essential hypertension    History of right breast cancer, 2004--status post mastectomy, chemotherapy, Adriamycin and Cytoxan.  Arimidex.    S/P right mastectomy    Therapeutic drug monitoring    Vitamin D deficiency    Postmenopausal state    Depression with anxiety    Chronic left-sided low back pain with left-sided sciatica    Obstructive sleep apnea    Spinal stenosis of lumbar region without neurogenic claudication    Chronic constipation    Overweight (BMI 25.0-29.9)    Rosacea    Statin intolerance, except for Lipitor.  Myalgias    Multiple environmental allergies    History of colon polyps    Microalbuminuria    History of COVID-19    Tortuous colon, patient cannot tolerate colonoscopy    Encounter for diabetic foot exam     Advance Care Planning Advance  "Directive is not on file.  ACP discussion was held with the patient during this visit. Patient has an advance directive (not in EMR), copy requested.            Objective   Vitals:    25 1000   BP: 136/70   Pulse: 71   Resp: 16   Temp: 97.8 °F (36.6 °C)   TempSrc: Oral   SpO2: 99%   Weight: 66.2 kg (146 lb)   Height: 162 cm (63.78\")       Estimated body mass index is 25.23 kg/m² as calculated from the following:    Height as of this encounter: 162 cm (63.78\").    Weight as of this encounter: 66.2 kg (146 lb).                Does the patient have evidence of cognitive impairment? No                                                                                               Health  Risk Assessment    Smoking Status:  Social History     Tobacco Use   Smoking Status Former    Current packs/day: 0.00    Average packs/day: 1 pack/day for 7.2 years (7.2 ttl pk-yrs)    Types: Cigarettes    Start date: 1971    Quit date: 1978    Years since quittin.8   Smokeless Tobacco Never     Alcohol Consumption:  Social History     Substance and Sexual Activity   Alcohol Use Yes    Comment: Only drink on cruises 1-2 drinks/day       Fall Risk Screen  STEADI Fall Risk Assessment was completed, and patient is at LOW risk for falls.Assessment completed on:2025    Depression Screening   Little interest or pleasure in doing things? Not at all   Feeling down, depressed, or hopeless? Not at all   PHQ-2 Total Score 0      Health Habits and Functional and Cognitive Screenin/18/2025     9:11 PM   Functional & Cognitive Status   Do you have difficulty preparing food and eating? No   Do you have difficulty bathing yourself, getting dressed or grooming yourself? No   Do you have difficulty using the toilet? No   Do you have difficulty moving around from place to place? No   Do you have trouble with steps or getting out of a bed or a chair? No   Current Diet Low Carb Diet   Dental Exam Not up to date   Eye Exam Up " to date   Exercise (times per week) 2 times per week   Current Exercises Include Dancing;Walking   Do you need help using the phone?  No   Are you deaf or do you have serious difficulty hearing?  No   Do you need help to go to places out of walking distance? No   Do you need help shopping? No   Do you need help preparing meals?  No   Do you need help with housework?  No   Do you need help with laundry? No   Do you need help taking your medications? No   Do you need help managing money? No   Do you ever drive or ride in a car without wearing a seat belt? No   Have you felt unusual stress, anger or loneliness in the last month? No   Who do you live with? Spouse   If you need help, do you have trouble finding someone available to you? Yes   Have you been bothered in the last four weeks by sexual problems? No   Do you have difficulty concentrating, remembering or making decisions? No           Age-appropriate Screening Schedule:  Refer to the list below for future screening recommendations based on patient's age, sex and/or medical conditions. Orders for these recommended tests are listed in the plan section. The patient has been provided with a written plan.    Health Maintenance List  Health Maintenance   Topic Date Due    DIABETIC EYE EXAM  01/31/2025    ANNUAL WELLNESS VISIT  03/12/2025    DIABETIC FOOT EXAM  04/09/2025    HEMOGLOBIN A1C  05/22/2025    ZOSTER VACCINE (3 of 3) 11/25/2025 (Originally 9/24/2019)    MAMMOGRAM  05/20/2026 (Originally 12/14/2024)    INFLUENZA VACCINE  07/01/2025    LIPID PANEL  11/22/2025    URINE MICROALBUMIN-CREATININE RATIO (uACR)  11/22/2025    TDAP/TD VACCINES (4 - Td or Tdap) 02/27/2027    DXA SCAN  03/03/2027    COLORECTAL CANCER SCREENING  10/03/2028    HEPATITIS C SCREENING  Completed    Pneumococcal Vaccine 50+  Completed    COVID-19 Vaccine  Discontinued                                                                                                                                                 CMS Preventative Services Quick Reference  Risk Factors Identified During Encounter  Immunizations Discussed/Encouraged: Shingrdeon    The above risks/problems have been discussed with the patient.  Pertinent information has been shared with the patient in the After Visit Summary.  An After Visit Summary and PPPS were made available to the patient.    Follow Up:   Next Medicare Wellness visit to be scheduled in 1 year.     Assessment & Plan  Encounter for subsequent annual wellness visit (AWV) in Medicare patient         Type 2 diabetes mellitus with diabetic microalbuminuria, without long-term current use of insulin      Orders:    Comprehensive Metabolic Panel    Hemoglobin A1c    Microalbumin / Creatinine Urine Ratio - Urine, Clean Catch    Urinalysis With Microscopic If Indicated (No Culture) - Urine, Clean Catch    Hyperlipidemia       Orders:    CK    Comprehensive Metabolic Panel    NMR LipoProfile    T4, Free    T3, Free    TSH    Benign essential hypertension      Orders:    Comprehensive Metabolic Panel    Vitamin D deficiency    Orders:    Vitamin D,25-Hydroxy    Microalbuminuria    Orders:    Microalbumin / Creatinine Urine Ratio - Urine, Clean Catch    History of COVID-19    Orders:    SARS-CoV-2 Antibodies, Nucleocapsid (Natural Immunity)    Therapeutic drug monitoring    Orders:    CBC (No Diff)    Breast cancer screening by mammogram    Orders:    Mammo Screening Digital Tomosynthesis Bilateral With CAD; Future         Follow Up:   No follow-ups on file.

## 2025-05-19 NOTE — ASSESSMENT & PLAN NOTE
Orders:    CK    Comprehensive Metabolic Panel    NMR LipoProfile    T4, Free    T3, Free    TSH

## 2025-05-22 DIAGNOSIS — E78.2 MIXED HYPERLIPIDEMIA: Chronic | ICD-10-CM

## 2025-05-22 DIAGNOSIS — F41.8 DEPRESSION WITH ANXIETY: Chronic | ICD-10-CM

## 2025-05-22 DIAGNOSIS — E11.9 TYPE 2 DIABETES MELLITUS WITHOUT COMPLICATION, WITHOUT LONG-TERM CURRENT USE OF INSULIN: Chronic | ICD-10-CM

## 2025-05-22 LAB
25(OH)D3+25(OH)D2 SERPL-MCNC: 97.7 NG/ML (ref 30–100)
ALBUMIN SERPL-MCNC: 4.7 G/DL (ref 3.9–4.9)
ALBUMIN/CREAT UR: <6 MG/G CREAT (ref 0–29)
ALP SERPL-CCNC: 55 IU/L (ref 44–121)
ALT SERPL-CCNC: 19 IU/L (ref 0–32)
APPEARANCE UR: CLEAR
AST SERPL-CCNC: 20 IU/L (ref 0–40)
BILIRUB SERPL-MCNC: 0.7 MG/DL (ref 0–1.2)
BILIRUB UR QL STRIP: NEGATIVE
BUN SERPL-MCNC: 25 MG/DL (ref 8–27)
BUN/CREAT SERPL: 35 (ref 12–28)
CALCIUM SERPL-MCNC: 10.3 MG/DL (ref 8.7–10.3)
CHLORIDE SERPL-SCNC: 102 MMOL/L (ref 96–106)
CHOLEST SERPL-MCNC: 146 MG/DL (ref 100–199)
CK SERPL-CCNC: 107 U/L (ref 32–182)
CO2 SERPL-SCNC: 23 MMOL/L (ref 20–29)
COLOR UR: YELLOW
CREAT SERPL-MCNC: 0.72 MG/DL (ref 0.57–1)
CREAT UR-MCNC: 48.5 MG/DL
EGFRCR SERPLBLD CKD-EPI 2021: 90 ML/MIN/1.73
ERYTHROCYTE [DISTWIDTH] IN BLOOD BY AUTOMATED COUNT: 12.7 % (ref 11.7–15.4)
GLOBULIN SER CALC-MCNC: 2.4 G/DL (ref 1.5–4.5)
GLUCOSE SERPL-MCNC: 95 MG/DL (ref 70–99)
GLUCOSE UR QL STRIP: ABNORMAL
HBA1C MFR BLD: 7 % (ref 4.8–5.6)
HCT VFR BLD AUTO: 47.6 % (ref 34–46.6)
HDL SERPL-SCNC: 38.3 UMOL/L
HDLC SERPL-MCNC: 62 MG/DL
HGB BLD-MCNC: 14.8 G/DL (ref 11.1–15.9)
HGB UR QL STRIP: NEGATIVE
KETONES UR QL STRIP: NEGATIVE
LDL SERPL QN: 20.2 NM
LDL SERPL-SCNC: 976 NMOL/L
LDL SMALL SERPL-SCNC: 521 NMOL/L
LDLC SERPL CALC-MCNC: 61 MG/DL (ref 0–99)
LEUKOCYTE ESTERASE UR QL STRIP: NEGATIVE
MCH RBC QN AUTO: 28 PG (ref 26.6–33)
MCHC RBC AUTO-ENTMCNC: 31.1 G/DL (ref 31.5–35.7)
MCV RBC AUTO: 90 FL (ref 79–97)
MICRO URNS: ABNORMAL
MICROALBUMIN UR-MCNC: <3 UG/ML
NITRITE UR QL STRIP: NEGATIVE
PH UR STRIP: 7.5 [PH] (ref 5–7.5)
PLATELET # BLD AUTO: 274 X10E3/UL (ref 150–450)
POTASSIUM SERPL-SCNC: 4.1 MMOL/L (ref 3.5–5.2)
PROT SERPL-MCNC: 7.1 G/DL (ref 6–8.5)
PROT UR QL STRIP: NEGATIVE
RBC # BLD AUTO: 5.29 X10E6/UL (ref 3.77–5.28)
SARS-COV-2 AB SERPL QL IA: POSITIVE
SODIUM SERPL-SCNC: 141 MMOL/L (ref 134–144)
SP GR UR STRIP: >=1.03 (ref 1–1.03)
T3FREE SERPL-MCNC: 3 PG/ML (ref 2–4.4)
T4 FREE SERPL-MCNC: 1.59 NG/DL (ref 0.82–1.77)
TRIGL SERPL-MCNC: 130 MG/DL (ref 0–149)
TSH SERPL DL<=0.005 MIU/L-ACNC: 0.14 UIU/ML (ref 0.45–4.5)
UROBILINOGEN UR STRIP-MCNC: 0.2 MG/DL (ref 0.2–1)
WBC # BLD AUTO: 6.9 X10E3/UL (ref 3.4–10.8)

## 2025-05-23 RX ORDER — BLOOD SUGAR DIAGNOSTIC
STRIP MISCELLANEOUS
Qty: 200 EACH | Refills: 0 | Status: SHIPPED | OUTPATIENT
Start: 2025-05-23

## 2025-05-23 RX ORDER — LEVOTHYROXINE SODIUM 125 UG/1
TABLET ORAL
Qty: 90 TABLET | Refills: 0 | Status: SHIPPED | OUTPATIENT
Start: 2025-05-23

## 2025-05-23 RX ORDER — ATORVASTATIN CALCIUM 10 MG/1
TABLET, FILM COATED ORAL
Qty: 90 TABLET | Refills: 0 | Status: SHIPPED | OUTPATIENT
Start: 2025-05-23

## 2025-05-23 RX ORDER — VENLAFAXINE HYDROCHLORIDE 75 MG/1
CAPSULE, EXTENDED RELEASE ORAL
Qty: 90 CAPSULE | Refills: 0 | Status: SHIPPED | OUTPATIENT
Start: 2025-05-23

## 2025-05-23 RX ORDER — GLIPIZIDE 5 MG/1
TABLET, FILM COATED, EXTENDED RELEASE ORAL
Qty: 90 TABLET | Refills: 0 | Status: SHIPPED | OUTPATIENT
Start: 2025-05-23

## 2025-05-28 DIAGNOSIS — E78.2 MIXED HYPERLIPIDEMIA: Chronic | ICD-10-CM

## 2025-05-29 RX ORDER — EZETIMIBE 10 MG/1
TABLET ORAL
Qty: 90 TABLET | Refills: 3 | Status: SHIPPED | OUTPATIENT
Start: 2025-05-29

## 2025-06-02 ENCOUNTER — TELEPHONE (OUTPATIENT)
Dept: INTERNAL MEDICINE | Facility: CLINIC | Age: 70
End: 2025-06-02
Payer: MEDICARE

## 2025-06-02 RX ORDER — BLOOD SUGAR DIAGNOSTIC
STRIP MISCELLANEOUS
Qty: 200 EACH | Refills: 1 | Status: SHIPPED | OUTPATIENT
Start: 2025-06-02 | End: 2025-06-09 | Stop reason: SDUPTHER

## 2025-06-02 NOTE — TELEPHONE ENCOUNTER
Caller: OLIVA    Relationship: [facility]     Best call back number: [phone]     What medication are you requesting: ONE TOUCH STRIPS-- MUST ADD DX CODE       If a prescription is needed, what is your preferred pharmacy and phone number: [id number] - [place] [id number] - [phone]  - [phone] FX     Additional notes: THEY ARE REQUESTING A NEW RX WITH THE DX CODE LISTED

## 2025-06-06 ENCOUNTER — OFFICE VISIT (OUTPATIENT)
Dept: INTERNAL MEDICINE | Facility: CLINIC | Age: 70
End: 2025-06-06
Payer: MEDICARE

## 2025-06-06 VITALS
DIASTOLIC BLOOD PRESSURE: 72 MMHG | HEART RATE: 82 BPM | RESPIRATION RATE: 16 BRPM | HEIGHT: 64 IN | BODY MASS INDEX: 24.92 KG/M2 | SYSTOLIC BLOOD PRESSURE: 128 MMHG | TEMPERATURE: 98.2 F | OXYGEN SATURATION: 97 % | WEIGHT: 146 LBS

## 2025-06-06 DIAGNOSIS — Z91.09 MULTIPLE ENVIRONMENTAL ALLERGIES: Chronic | ICD-10-CM

## 2025-06-06 DIAGNOSIS — R80.9 TYPE 2 DIABETES MELLITUS WITH DIABETIC MICROALBUMINURIA, WITHOUT LONG-TERM CURRENT USE OF INSULIN: Primary | Chronic | ICD-10-CM

## 2025-06-06 DIAGNOSIS — F41.8 DEPRESSION WITH ANXIETY: Chronic | ICD-10-CM

## 2025-06-06 DIAGNOSIS — Z78.9 STATIN INTOLERANCE: Chronic | ICD-10-CM

## 2025-06-06 DIAGNOSIS — E78.2 MIXED HYPERLIPIDEMIA: Chronic | ICD-10-CM

## 2025-06-06 DIAGNOSIS — Z51.81 THERAPEUTIC DRUG MONITORING: ICD-10-CM

## 2025-06-06 DIAGNOSIS — E55.9 VITAMIN D DEFICIENCY: Chronic | ICD-10-CM

## 2025-06-06 DIAGNOSIS — I10 BENIGN ESSENTIAL HYPERTENSION: Chronic | ICD-10-CM

## 2025-06-06 DIAGNOSIS — Q43.8 TORTUOUS COLON: Chronic | ICD-10-CM

## 2025-06-06 DIAGNOSIS — E11.29 TYPE 2 DIABETES MELLITUS WITH DIABETIC MICROALBUMINURIA, WITHOUT LONG-TERM CURRENT USE OF INSULIN: Primary | Chronic | ICD-10-CM

## 2025-06-06 DIAGNOSIS — K59.09 CHRONIC CONSTIPATION: Chronic | ICD-10-CM

## 2025-06-06 DIAGNOSIS — M48.061 SPINAL STENOSIS OF LUMBAR REGION WITHOUT NEUROGENIC CLAUDICATION: Chronic | ICD-10-CM

## 2025-06-06 DIAGNOSIS — Z86.16 HISTORY OF COVID-19: Chronic | ICD-10-CM

## 2025-06-06 DIAGNOSIS — Z86.0100 HISTORY OF COLON POLYPS: Chronic | ICD-10-CM

## 2025-06-06 DIAGNOSIS — M54.42 CHRONIC LEFT-SIDED LOW BACK PAIN WITH LEFT-SIDED SCIATICA: Chronic | ICD-10-CM

## 2025-06-06 DIAGNOSIS — G89.29 CHRONIC LEFT-SIDED LOW BACK PAIN WITH LEFT-SIDED SCIATICA: Chronic | ICD-10-CM

## 2025-06-06 DIAGNOSIS — Z85.3 HISTORY OF RIGHT BREAST CANCER: Chronic | ICD-10-CM

## 2025-06-06 DIAGNOSIS — Z78.0 POSTMENOPAUSAL STATE: Chronic | ICD-10-CM

## 2025-06-06 DIAGNOSIS — B37.31 VAGINAL CANDIDIASIS: ICD-10-CM

## 2025-06-06 DIAGNOSIS — G47.33 OBSTRUCTIVE SLEEP APNEA: Chronic | ICD-10-CM

## 2025-06-06 DIAGNOSIS — E66.3 OVERWEIGHT (BMI 25.0-29.9): Chronic | ICD-10-CM

## 2025-06-06 DIAGNOSIS — E11.9 ENCOUNTER FOR DIABETIC FOOT EXAM: Chronic | ICD-10-CM
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
## 2025-06-06 DIAGNOSIS — Z90.11 S/P RIGHT MASTECTOMY: Chronic | ICD-10-CM

## 2025-06-06 DIAGNOSIS — R80.9 MICROALBUMINURIA: Chronic | ICD-10-CM

## 2025-06-06 RX ORDER — DOXYCYCLINE 100 MG/1
CAPSULE ORAL
Qty: 24 CAPSULE | Refills: 0 | Status: SHIPPED | OUTPATIENT
Start: 2025-06-06

## 2025-06-06 RX ORDER — FLUCONAZOLE 200 MG/1
TABLET ORAL
Qty: 10 TABLET | Refills: 1 | Status: SHIPPED | OUTPATIENT
Start: 2025-06-06

## 2025-06-06 NOTE — PROGRESS NOTES
06/06/2025    Patient Information  Day Goldstein                                                                                          [address] [place] [address]      1955  [unfilled]  There is no work phone number on file.    Chief Complaint:     Follow-up chronic medical problems and recent blood work.  No new acute complaints.    History of Present Illness:    Patient with multiple chronic medical problems including type 2 diabetes as noted below in assessment and plan presents today for follow-up with lab prior in order to monitor these chronic medical issues.  Her past medical history reviewed and updated were necessary including health maintenance parameters.  This reveals she may need diabetic eye exam which I encouraged her to get.    Review of Systems   Constitutional: Negative. Negative for chills, diaphoresis and fever.   HENT:  Positive for congestion. Negative for sore throat.    Eyes: Negative.    Cardiovascular: Negative.  Negative for chest pain.   Respiratory: Negative.  Negative for cough.    Endocrine: Negative.    Hematologic/Lymphatic: Negative.    Skin: Negative.  Negative for rash.   Musculoskeletal: Negative.  Negative for joint pain, myalgias and neck pain.   Gastrointestinal: Negative.  Negative for abdominal pain, anorexia, nausea and vomiting.   Genitourinary: Negative.  Negative for dysuria.   Neurological:  Positive for headaches. Negative for numbness, vertigo and weakness.   Psychiatric/Behavioral: Negative.     Allergic/Immunologic: Negative.        Active Problems:    Patient Active Problem List   Diagnosis    Type 2 diabetes mellitus with diabetic microalbuminuria, without long-term current use of insulin    Hyperlipidemia    Benign essential hypertension    History of right breast cancer, 2004--status post mastectomy, chemotherapy, Adriamycin and Cytoxan.  Arimidex.    S/P right mastectomy    Therapeutic drug monitoring    Vitamin D deficiency     Postmenopausal state    Depression with anxiety    Chronic left-sided low back pain with left-sided sciatica    Obstructive sleep apnea    Spinal stenosis of lumbar region without neurogenic claudication    Chronic constipation    Overweight (BMI 25.0-29.9)    Rosacea    Statin intolerance, except for Lipitor.  Myalgias    Multiple environmental allergies    History of colon polyps    Microalbuminuria    History of COVID-19    Tortuous colon, patient cannot tolerate colonoscopy    Encounter for diabetic foot exam    Tick bite of multiple sites         Past Medical History:   Diagnosis Date    Benign essential hypertension 01/22/2020    Chronic constipation 04/04/2022    Chronic left-sided low back pain with left-sided sciatica 04/04/2022 October 30, 2020--MRI of the lumbar spine reveals no evidence of osseous metastatic disease.  Facet osteophytes and disc bulges resulting in moderate to severe neural foraminal stenosis at L3-L4, L4-L5, and L5-S1.  There is moderate to severe spinal stenosis at L3-L4 and L4-L5.    Chronic lower back pain 04/04/2022 October 30, 2020--MRI of the lumbar spine reveals no evidence of osseous metastatic disease.  Facet osteophytes and disc bulges resulting in moderate to severe neural foraminal stenosis at L3-L4, L4-L5, and L5-S1.  There is moderate to severe spinal stenosis at L3-L4 and L4-L5.    Depression with anxiety 04/01/2022    History of colon polyps 04/04/2022 August 22, 2018--colonoscopy was normal except for redundant colon.  This was followed up with an air-contrast barium enema which revealed a very long and tortuous colon.  No mass or polyp was noted.    History of COVID-19 11/16/2022 August 2022--patient tested positive for COVID.  Symptoms were mild and resolved without sequelae.      History of right breast cancer 10/16/2017    3/23/2004: Right mastectomy/axillary node dissection.    Hyperlipidemia 01/22/2020    Multiple environmental allergies 04/04/2022     Obstructive sleep apnea 2022    Overweight (BMI 25.0-29.9) 2022    Postmenopausal state 2022    S/P right mastectomy 2020    3/23/2004: Right mastectomy/axillary node dissection.    Spinal stenosis of lumbar region without neurogenic claudication 2022    Statin intolerance, except for Lipitor.  Myalgias 2022    Tortuous colon, patient cannot tolerate colonoscopy 2024    Type 2 diabetes mellitus with diabetic microalbuminuria, without long-term current use of insulin 2020    Vitamin D deficiency 2022         Past Surgical History:   Procedure Laterality Date    BREAST BIOPSY Left      SECTION  ,     COLONOSCOPY  2018--colonoscopy was normal except for redundant colon.  This was followed up with an air-contrast barium enema which revealed a very long and tortuous colon.  No mass or polyp was noted.    COSMETIC SURGERY  2004    Eyelid lift    EYE SURGERY  PRK. Cataract    .2015    HEMORRHOIDECTOMY  2022--excision of internal and external grade 3 hemorrhoids.    LYMPH NODE BIOPSY  2018    Negative    MASTECTOMY MODIFIED RADICAL / SIMPLE / COMPLETE Right 2004    3/23/2004: Right mastectomy/axillary node dissection.    REDUCTION MAMMAPLASTY Left     SEPTOPLASTY  2005    SINUS SURGERY  2005    TONSILLECTOMY      TUBAL ABDOMINAL LIGATION           Allergies   Allergen Reactions    Hydromorphone Hcl Itching    Statins Myalgia    Thimerosal (Thiomersal) Unknown - High Severity    Penicillins Itching and Rash           Current Outpatient Medications:     atorvastatin (LIPITOR) 10 MG tablet, TAKE 1 TABLET BY MOUTH EVERY DAY for high cholesterol, Disp: 90 tablet, Rfl: 0    CALCIUM & MAGNESIUM CARBONATES PO, Take 1 tablet by mouth Daily., Disp: , Rfl:     cetirizine (zyrTEC) 10 MG tablet, Take 1 tablet by mouth Daily., Disp: , Rfl:     Cholecalciferol (vitamin D3) 125 MCG (5000 UT)  capsule capsule, Take 1 capsule by mouth Daily., Disp: , Rfl:     Coenzyme Q10 (CoQ10 Maximum Strength) 400 MG capsule, Take 1 p.o. daily with food as directed, Disp: , Rfl: 0    dapagliflozin Propanediol (Farxiga) 10 MG tablet, Take 1 p.o. daily before the largest meal for diabetes, Disp: , Rfl:     docusate calcium (SURFAK) 240 MG capsule, Take 1 capsule by mouth., Disp: , Rfl:     ezetimibe (ZETIA) 10 MG tablet, TAKE 1 TABLET BY MOUTH EVERY DAY for high cholesterol, Disp: 90 tablet, Rfl: 3    glipizide (GLUCOTROL XL) 5 MG ER tablet, TAKE 1 TABLET BY MOUTH EVERY DAY for diabetes, Disp: 90 tablet, Rfl: 0    guaiFENesin (MUCINEX) 600 MG 12 hr tablet, Take 2 tablets by mouth., Disp: , Rfl:     levothyroxine (SYNTHROID, LEVOTHROID) 125 MCG tablet, TAKE 1 TABLET BY MOUTH EVERY DAY FOR LOW THYROID, Disp: 90 tablet, Rfl: 0    loratadine (CLARITIN) 10 MG tablet, Take 1 tablet by mouth Daily., Disp: , Rfl:     metFORMIN (GLUCOPHAGE) 1000 MG tablet, TAKE 1 TABLET BY MOUTH 2 TIMES A DAY WITH MEALS, Disp: 180 tablet, Rfl: 0    olmesartan-hydrochlorothiazide (BENICAR HCT) 40-12.5 MG per tablet, Take 1 p.o. daily for blood pressure and kidney protection, Disp: , Rfl:     Omega-3 Fatty Acids (FISH OIL) 1000 MG capsule capsule, Take 1 capsule by mouth., Disp: , Rfl:     OneTouch Verio test strip, USE TO TEST BLOOD GLUCOSE 2 TIMES DAILY, Disp: 200 each, Rfl: 1    Polyethylene Glycol 3350 (MIRALAX PO), Take  by mouth., Disp: , Rfl:     Semaglutide, 2 MG/DOSE, (OZEMPIC) 8 MG/3ML solution pen-injector, Inject 2 mg subcutaneously weekly as directed, Disp: 3 mL, Rfl: 11    venlafaxine XR (EFFEXOR-XR) 75 MG 24 hr capsule, TAKE 1 CAPSULE BY MOUTH EVERY DAY FOR DEPRESSION, Disp: 90 capsule, Rfl: 0    doxycycline (VIBRAMYCIN) 100 MG capsule, Take 1 capsule twice daily until gone, Disp: 24 capsule, Rfl: 0    fluconazole (DIFLUCAN) 200 MG tablet, Take 1 tablet (200 mg) a day until gone., Disp: 10 tablet, Rfl: 1      Family History   Problem  "Relation Age of Onset    Emphysema Mother     Arthritis Mother     COPD Mother         smoker    Alcohol abuse Father     COPD Father         smoker    Hyperlipidemia Sister     Breast cancer Sister     Cancer Sister         Bilateral breast cancer- w2 tumors /breast    Hypertension Sister     Prostate cancer Brother     Hypothyroidism Brother     Cancer Brother         Prostate cancer- very slow growing, no tx    Hyperlipidemia Brother     Depression Sister         on meds    Hearing loss Sister         Bilateral Meinier disease    Thyroid disease Sister         Hyperthyroid    Thyroid disease Sister         Hyperthyroid    Vision loss Sister         Glaucoma    Hyperlipidemia Brother     Thyroid disease Brother         Hypothyroid         Social History     Socioeconomic History    Marital status:    Tobacco Use    Smoking status: Former     Current packs/day: 0.00     Average packs/day: 1 pack/day for 7.2 years (7.2 ttl pk-yrs)     Types: Cigarettes     Start date: 1971     Quit date: 1978     Years since quittin.8    Smokeless tobacco: Never   Vaping Use    Vaping status: Never Used   Substance and Sexual Activity    Alcohol use: Yes     Comment: Only drink on cruises 1-2 drinks/day    Drug use: Never    Sexual activity: Not Currently     Partners: Male     Birth control/protection: Post-menopausal         Vitals:    25 1104   BP: 128/72   Pulse: 82   Resp: 16   Temp: 98.2 °F (36.8 °C)   TempSrc: Oral   SpO2: 97%   Weight: 66.2 kg (146 lb)   Height: 162 cm (63.78\")        Body mass index is 25.23 kg/m².      Physical Exam:    General: Alert and oriented x 3.  No acute distress.  Just slightly overweight.  Normal affect.  HEENT: Pupils equal, round, reactive to light; extraocular movements intact; sclerae nonicteric; pharynx, ear canals and TMs normal.  Neck: Without JVD, thyromegaly, bruit, or adenopathy.  Lungs: Clear to auscultation in all fields.  Heart: Regular rate and rhythm " without murmur, rub, gallop, or click.  Abdomen: Soft, nontender, without hepatosplenomegaly or hernia.  Bowel sounds normal.  : Deferred.  Rectal: Deferred.  Extremities: Without clubbing, cyanosis, edema, or pulse deficit.  Neurologic: Intact without focal deficit.  Normal station and gait observed during ingress and egress from the examination room.  Skin: Without significant lesion.  Musculoskeletal: Unremarkable.    June 6, 2025--routine diabetic foot exam reveals no evidence of diabetic foot ulcer or preulcerative callus.  Distal pulses palpable.  Sensation intact.    Lab/other results:    SARS antibodies are positive.  Hemoglobin normal at 14.8.  Hematocrit slightly elevated 47.6.  CK normal at 107.  CMP normal.  Hemoglobin A1c 7.0.  Total cholesterol 146, triglycerides 130, LDL particle #976, HDL particle #38.3.  Microalbumin/creatinine ratio was less than 6.  Thyroid function tests reveal slightly suppressed TSH at 0.135.  Urinalysis reveals 3+ glucose.  Vitamin D is upper limit of normal at 97.7.    Assessment/Plan:     Diagnosis Plan   1. Type 2 diabetes mellitus with diabetic microalbuminuria, without long-term current use of insulin  Comprehensive Metabolic Panel    Hemoglobin A1c    Urinalysis With Microscopic If Indicated (No Culture) - Urine, Clean Catch      2. Microalbuminuria  Microalbumin / Creatinine Urine Ratio - Urine, Clean Catch      3. Hyperlipidemia  CK    Comprehensive Metabolic Panel    NMR LipoProfile    TSH    T4, Free    T3, Free      4. Benign essential hypertension  Comprehensive Metabolic Panel      5. Vitamin D deficiency  Vitamin D,25-Hydroxy      6. Statin intolerance, except for Lipitor.  Myalgias        7. History of COVID-19  SARS-CoV-2 Antibodies, Nucleocapsid (Natural Immunity)      8. History of right breast cancer, 2004--status post mastectomy, chemotherapy, Adriamycin and Cytoxan.  Arimidex.        9. S/P right mastectomy        10. Postmenopausal state        11.  Depression with anxiety        12. Chronic left-sided low back pain with left-sided sciatica        13. Obstructive sleep apnea        14. Spinal stenosis of lumbar region without neurogenic claudication        15. Chronic constipation        16. Overweight (BMI 25.0-29.9)        17. Multiple environmental allergies        18. History of colon polyps        19. Tortuous colon, patient cannot tolerate colonoscopy        20. Encounter for diabetic foot exam        21. Therapeutic drug monitoring  CBC (No Diff)      22. Tick bite of multiple sites  doxycycline (VIBRAMYCIN) 100 MG capsule      23. Vaginal candidiasis  fluconazole (DIFLUCAN) 200 MG tablet        Patient has type 2 diabetes that is under good control.  Microalbuminuria is also under good control.  Her hyperlipidemia is under good control as well.  Blood pressure has been well-controlled.  Vitamin D is in the upper limit of normal and we need to back off of vitamin D supplementation.  She has a history of COVID and has SARS antibodies.  She has remote history of right breast cancer in 2004 and had a mastectomy, chemotherapy and Arimidex.  No evidence of recurrence.  She is postmenopausal and up-to-date on her DEXA scan.  Her depression with anxiety seems to be stable with Effexor.  Sleep apnea symptoms seem to be controlled.  She is overweight and I recommended low carbohydrate diet and attainment of ideal body weight.  She has a history of colon polyps but has a torturous colon and cannot tolerate colonoscopy.  Her TSH is just slightly suppressed and likely lab error.  We will monitor this.    Plan is as follows: Patient should start cutting her vitamin D supplementation 5000 units/day in half daily.  Continue with dietary efforts.  No other changes in medical regimen.  I will have patient obtain fasting lab work and follow-up in 6 months.  Otherwise she will follow-up as needed.  Recommend diabetic eye exam.    Addendum: Patient reports multiple tick  bites on her torso about a week ago.  She has removed 5 ticks during this time.  She reports one of the bites to left a Bullseye type of rash.  She does not have any current symptoms.  On exam I do see several what appear to be tick bites on her torso.  There is an erythematous circular rash on the right flank area that is approximately 3-1/2 cm in diameter.  Somewhat difficult situation.  We can order test for Lyme although patient is not symptomatic.  Another option would be to go ahead and treat her empirically with doxycycline.  The other option would be to do nothing.  After further discussion we have decided to just go ahead and treat her with doxycycline empirically 100 mg twice daily x 12 days.  If she should develop any symptoms then she needs to contact me.  Patient needs to avoid calcium and magnesium supplementation while taking the doxycycline.  She can then start those supplementation back.  Also patient is at risk for yeast infections and will give her prescription for fluconazole.    Procedures

## 2025-06-09 RX ORDER — BLOOD SUGAR DIAGNOSTIC
STRIP MISCELLANEOUS
Qty: 200 EACH | Refills: 3 | Status: SHIPPED | OUTPATIENT
Start: 2025-06-09